# Patient Record
Sex: FEMALE | Race: BLACK OR AFRICAN AMERICAN | NOT HISPANIC OR LATINO | Employment: OTHER | URBAN - METROPOLITAN AREA
[De-identification: names, ages, dates, MRNs, and addresses within clinical notes are randomized per-mention and may not be internally consistent; named-entity substitution may affect disease eponyms.]

---

## 2018-09-17 ENCOUNTER — TRANSCRIBE ORDERS (OUTPATIENT)
Dept: ADMINISTRATIVE | Facility: HOSPITAL | Age: 68
End: 2018-09-17

## 2018-09-17 DIAGNOSIS — R90.82 WHITE MATTER DISEASE: Primary | ICD-10-CM

## 2018-10-02 ENCOUNTER — HOSPITAL ENCOUNTER (OUTPATIENT)
Dept: MRI IMAGING | Facility: HOSPITAL | Age: 68
Discharge: HOME | End: 2018-10-02
Admitting: PSYCHIATRY & NEUROLOGY

## 2018-10-02 ENCOUNTER — APPOINTMENT (OUTPATIENT)
Dept: MRI IMAGING | Facility: HOSPITAL | Age: 68
End: 2018-10-02

## 2018-10-02 ENCOUNTER — HOSPITAL ENCOUNTER (OUTPATIENT)
Dept: MRI IMAGING | Facility: HOSPITAL | Age: 68
Discharge: HOME | End: 2018-10-02

## 2018-10-02 DIAGNOSIS — R90.82 WHITE MATTER DISEASE: ICD-10-CM

## 2018-10-02 PROCEDURE — 70551 MRI BRAIN STEM W/O DYE: CPT

## 2018-10-02 PROCEDURE — 72141 MRI NECK SPINE W/O DYE: CPT

## 2019-01-10 ENCOUNTER — APPOINTMENT (OUTPATIENT)
Dept: CT IMAGING | Facility: HOSPITAL | Age: 69
End: 2019-01-10

## 2019-01-10 ENCOUNTER — APPOINTMENT (OUTPATIENT)
Dept: GENERAL RADIOLOGY | Facility: HOSPITAL | Age: 69
End: 2019-01-10

## 2019-01-10 ENCOUNTER — HOSPITAL ENCOUNTER (OUTPATIENT)
Facility: HOSPITAL | Age: 69
Setting detail: OBSERVATION
Discharge: HOME | End: 2019-01-13
Attending: EMERGENCY MEDICINE | Admitting: HOSPITALIST

## 2019-01-10 DIAGNOSIS — R26.2 DIFFICULTY WALKING: ICD-10-CM

## 2019-01-10 DIAGNOSIS — R42 VERTIGO: Primary | ICD-10-CM

## 2019-01-10 LAB
ALBUMIN SERPL-MCNC: 4.1 G/DL (ref 3.5–5.2)
ALBUMIN/GLOB SERPL: 1.1 G/DL
ALP SERPL-CCNC: 81 U/L (ref 39–117)
ALT SERPL W P-5'-P-CCNC: 18 U/L (ref 1–33)
ANION GAP SERPL CALCULATED.3IONS-SCNC: 13.7 MMOL/L
AST SERPL-CCNC: 21 U/L (ref 1–32)
BASOPHILS # BLD AUTO: 0.03 10*3/MM3 (ref 0–0.2)
BASOPHILS NFR BLD AUTO: 0.5 % (ref 0–1.5)
BILIRUB SERPL-MCNC: 0.3 MG/DL (ref 0.1–1.2)
BUN BLD-MCNC: 12 MG/DL (ref 8–23)
BUN/CREAT SERPL: 13.6 (ref 7–25)
CALCIUM SPEC-SCNC: 10.5 MG/DL (ref 8.6–10.5)
CHLORIDE SERPL-SCNC: 97 MMOL/L (ref 98–107)
CO2 SERPL-SCNC: 30.3 MMOL/L (ref 22–29)
CREAT BLD-MCNC: 0.88 MG/DL (ref 0.57–1)
D-LACTATE SERPL-SCNC: 0.9 MMOL/L (ref 0.5–2)
DEPRECATED RDW RBC AUTO: 44.6 FL (ref 37–54)
EOSINOPHIL # BLD AUTO: 0.46 10*3/MM3 (ref 0–0.7)
EOSINOPHIL NFR BLD AUTO: 7.5 % (ref 0.3–6.2)
ERYTHROCYTE [DISTWIDTH] IN BLOOD BY AUTOMATED COUNT: 13.2 % (ref 11.7–13)
FLUAV AG NPH QL: NEGATIVE
FLUBV AG NPH QL IA: NEGATIVE
GFR SERPL CREATININE-BSD FRML MDRD: 77 ML/MIN/1.73
GLOBULIN UR ELPH-MCNC: 3.8 GM/DL
GLUCOSE BLD-MCNC: 132 MG/DL (ref 65–99)
HCT VFR BLD AUTO: 36.3 % (ref 35.6–45.5)
HGB BLD-MCNC: 11.7 G/DL (ref 11.9–15.5)
IMM GRANULOCYTES # BLD AUTO: 0 10*3/MM3 (ref 0–0.03)
IMM GRANULOCYTES NFR BLD AUTO: 0 % (ref 0–0.5)
LYMPHOCYTES # BLD AUTO: 1.97 10*3/MM3 (ref 0.9–4.8)
LYMPHOCYTES NFR BLD AUTO: 32 % (ref 19.6–45.3)
MCH RBC QN AUTO: 29.8 PG (ref 26.9–32)
MCHC RBC AUTO-ENTMCNC: 32.2 G/DL (ref 32.4–36.3)
MCV RBC AUTO: 92.4 FL (ref 80.5–98.2)
MONOCYTES # BLD AUTO: 0.47 10*3/MM3 (ref 0.2–1.2)
MONOCYTES NFR BLD AUTO: 7.6 % (ref 5–12)
NEUTROPHILS # BLD AUTO: 3.23 10*3/MM3 (ref 1.9–8.1)
NEUTROPHILS NFR BLD AUTO: 52.4 % (ref 42.7–76)
PLATELET # BLD AUTO: 246 10*3/MM3 (ref 140–500)
PMV BLD AUTO: 10.2 FL (ref 6–12)
POTASSIUM BLD-SCNC: 4.1 MMOL/L (ref 3.5–5.2)
PROCALCITONIN SERPL-MCNC: 0.05 NG/ML (ref 0.1–0.25)
PROT SERPL-MCNC: 7.9 G/DL (ref 6–8.5)
RBC # BLD AUTO: 3.93 10*6/MM3 (ref 3.9–5.2)
SODIUM BLD-SCNC: 141 MMOL/L (ref 136–145)
TROPONIN T SERPL-MCNC: <0.01 NG/ML (ref 0–0.03)
WBC NRBC COR # BLD: 6.16 10*3/MM3 (ref 4.5–10.7)

## 2019-01-10 PROCEDURE — 36415 COLL VENOUS BLD VENIPUNCTURE: CPT | Performed by: EMERGENCY MEDICINE

## 2019-01-10 PROCEDURE — 83605 ASSAY OF LACTIC ACID: CPT | Performed by: EMERGENCY MEDICINE

## 2019-01-10 PROCEDURE — 96374 THER/PROPH/DIAG INJ IV PUSH: CPT

## 2019-01-10 PROCEDURE — 70450 CT HEAD/BRAIN W/O DYE: CPT

## 2019-01-10 PROCEDURE — 93005 ELECTROCARDIOGRAM TRACING: CPT | Performed by: EMERGENCY MEDICINE

## 2019-01-10 PROCEDURE — 84484 ASSAY OF TROPONIN QUANT: CPT | Performed by: EMERGENCY MEDICINE

## 2019-01-10 PROCEDURE — 80053 COMPREHEN METABOLIC PANEL: CPT | Performed by: EMERGENCY MEDICINE

## 2019-01-10 PROCEDURE — 25010000002 ONDANSETRON PER 1 MG: Performed by: EMERGENCY MEDICINE

## 2019-01-10 PROCEDURE — 93010 ELECTROCARDIOGRAM REPORT: CPT | Performed by: INTERNAL MEDICINE

## 2019-01-10 PROCEDURE — 87040 BLOOD CULTURE FOR BACTERIA: CPT | Performed by: EMERGENCY MEDICINE

## 2019-01-10 PROCEDURE — 71046 X-RAY EXAM CHEST 2 VIEWS: CPT

## 2019-01-10 PROCEDURE — 96361 HYDRATE IV INFUSION ADD-ON: CPT

## 2019-01-10 PROCEDURE — 87804 INFLUENZA ASSAY W/OPTIC: CPT | Performed by: EMERGENCY MEDICINE

## 2019-01-10 PROCEDURE — 99285 EMERGENCY DEPT VISIT HI MDM: CPT

## 2019-01-10 PROCEDURE — 85025 COMPLETE CBC W/AUTO DIFF WBC: CPT | Performed by: EMERGENCY MEDICINE

## 2019-01-10 PROCEDURE — 84145 PROCALCITONIN (PCT): CPT | Performed by: EMERGENCY MEDICINE

## 2019-01-10 RX ORDER — VALSARTAN 320 MG/1
320 TABLET ORAL DAILY
COMMUNITY

## 2019-01-10 RX ORDER — AMLODIPINE BESYLATE 10 MG/1
10 TABLET ORAL DAILY
COMMUNITY

## 2019-01-10 RX ORDER — FEBUXOSTAT 40 MG/1
40 TABLET, FILM COATED ORAL DAILY
COMMUNITY

## 2019-01-10 RX ORDER — GABAPENTIN 600 MG/1
600 TABLET ORAL 2 TIMES DAILY
COMMUNITY

## 2019-01-10 RX ORDER — PROMETHAZINE HYDROCHLORIDE AND CODEINE PHOSPHATE 6.25; 1 MG/5ML; MG/5ML
5 SYRUP ORAL EVERY 4 HOURS PRN
COMMUNITY
End: 2021-03-02

## 2019-01-10 RX ORDER — OMEPRAZOLE 40 MG/1
40 CAPSULE, DELAYED RELEASE ORAL 2 TIMES DAILY
COMMUNITY

## 2019-01-10 RX ORDER — BUDESONIDE 0.5 MG/2ML
0.5 INHALANT ORAL 2 TIMES DAILY
COMMUNITY

## 2019-01-10 RX ORDER — DIAZEPAM 5 MG/1
5 TABLET ORAL 2 TIMES DAILY PRN
COMMUNITY

## 2019-01-10 RX ORDER — PROPRANOLOL HYDROCHLORIDE 10 MG/1
10 TABLET ORAL DAILY PRN
COMMUNITY

## 2019-01-10 RX ORDER — FERROUS SULFATE 325(65) MG
325 TABLET ORAL 2 TIMES DAILY
COMMUNITY

## 2019-01-10 RX ORDER — ASPIRIN 81 MG/1
81 TABLET ORAL DAILY
COMMUNITY

## 2019-01-10 RX ORDER — SENNOSIDES 8.6 MG
650 CAPSULE ORAL EVERY 8 HOURS PRN
COMMUNITY

## 2019-01-10 RX ORDER — ONDANSETRON 2 MG/ML
4 INJECTION INTRAMUSCULAR; INTRAVENOUS ONCE
Status: COMPLETED | OUTPATIENT
Start: 2019-01-10 | End: 2019-01-10

## 2019-01-10 RX ORDER — NITROGLYCERIN 400 UG/1
1 SPRAY ORAL
COMMUNITY

## 2019-01-10 RX ORDER — MECLIZINE HYDROCHLORIDE 25 MG/1
25 TABLET ORAL ONCE
Status: COMPLETED | OUTPATIENT
Start: 2019-01-10 | End: 2019-01-10

## 2019-01-10 RX ORDER — ROSUVASTATIN CALCIUM 20 MG/1
20 TABLET, COATED ORAL DAILY
COMMUNITY

## 2019-01-10 RX ORDER — FUROSEMIDE 40 MG/1
40 TABLET ORAL 2 TIMES DAILY
COMMUNITY

## 2019-01-10 RX ORDER — SODIUM CHLORIDE 0.9 % (FLUSH) 0.9 %
10 SYRINGE (ML) INJECTION AS NEEDED
Status: DISCONTINUED | OUTPATIENT
Start: 2019-01-10 | End: 2019-01-13 | Stop reason: HOSPADM

## 2019-01-10 RX ORDER — IPRATROPIUM BROMIDE AND ALBUTEROL SULFATE 2.5; .5 MG/3ML; MG/3ML
3 SOLUTION RESPIRATORY (INHALATION) EVERY 4 HOURS PRN
COMMUNITY

## 2019-01-10 RX ORDER — HYDROCODONE BITARTRATE AND ACETAMINOPHEN 10; 325 MG/1; MG/1
1 TABLET ORAL EVERY 6 HOURS PRN
COMMUNITY

## 2019-01-10 RX ORDER — PROMETHAZINE HYDROCHLORIDE 25 MG/1
25 TABLET ORAL 2 TIMES DAILY PRN
COMMUNITY

## 2019-01-10 RX ADMIN — ONDANSETRON 4 MG: 2 INJECTION INTRAMUSCULAR; INTRAVENOUS at 23:14

## 2019-01-10 RX ADMIN — MECLIZINE HYDROCHLORIDE 25 MG: 25 TABLET ORAL at 23:45

## 2019-01-10 RX ADMIN — SODIUM CHLORIDE 1000 ML: 9 INJECTION, SOLUTION INTRAVENOUS at 23:11

## 2019-01-11 PROBLEM — R42 VERTIGO: Status: ACTIVE | Noted: 2019-01-11

## 2019-01-11 LAB
ALBUMIN SERPL-MCNC: 3.7 G/DL (ref 3.5–5.2)
ALBUMIN/GLOB SERPL: 1.2 G/DL
ALP SERPL-CCNC: 66 U/L (ref 39–117)
ALT SERPL W P-5'-P-CCNC: 16 U/L (ref 1–33)
ANION GAP SERPL CALCULATED.3IONS-SCNC: 12.2 MMOL/L
AST SERPL-CCNC: 18 U/L (ref 1–32)
BASOPHILS # BLD AUTO: 0.02 10*3/MM3 (ref 0–0.2)
BASOPHILS NFR BLD AUTO: 0.4 % (ref 0–1.5)
BILIRUB SERPL-MCNC: 0.4 MG/DL (ref 0.1–1.2)
BUN BLD-MCNC: 9 MG/DL (ref 8–23)
BUN/CREAT SERPL: 10.1 (ref 7–25)
CALCIUM SPEC-SCNC: 9.8 MG/DL (ref 8.6–10.5)
CHLORIDE SERPL-SCNC: 100 MMOL/L (ref 98–107)
CO2 SERPL-SCNC: 27.8 MMOL/L (ref 22–29)
CREAT BLD-MCNC: 0.89 MG/DL (ref 0.57–1)
DEPRECATED RDW RBC AUTO: 45.9 FL (ref 37–54)
EOSINOPHIL # BLD AUTO: 0.35 10*3/MM3 (ref 0–0.7)
EOSINOPHIL NFR BLD AUTO: 7.2 % (ref 0.3–6.2)
ERYTHROCYTE [DISTWIDTH] IN BLOOD BY AUTOMATED COUNT: 13.4 % (ref 11.7–13)
GFR SERPL CREATININE-BSD FRML MDRD: 76 ML/MIN/1.73
GLOBULIN UR ELPH-MCNC: 3.1 GM/DL
GLUCOSE BLD-MCNC: 143 MG/DL (ref 65–99)
HCT VFR BLD AUTO: 37.2 % (ref 35.6–45.5)
HGB BLD-MCNC: 11.6 G/DL (ref 11.9–15.5)
IMM GRANULOCYTES # BLD AUTO: 0 10*3/MM3 (ref 0–0.03)
IMM GRANULOCYTES NFR BLD AUTO: 0 % (ref 0–0.5)
LYMPHOCYTES # BLD AUTO: 1.74 10*3/MM3 (ref 0.9–4.8)
LYMPHOCYTES NFR BLD AUTO: 35.7 % (ref 19.6–45.3)
MCH RBC QN AUTO: 29.3 PG (ref 26.9–32)
MCHC RBC AUTO-ENTMCNC: 31.2 G/DL (ref 32.4–36.3)
MCV RBC AUTO: 93.9 FL (ref 80.5–98.2)
MONOCYTES # BLD AUTO: 0.25 10*3/MM3 (ref 0.2–1.2)
MONOCYTES NFR BLD AUTO: 5.1 % (ref 5–12)
NEUTROPHILS # BLD AUTO: 2.51 10*3/MM3 (ref 1.9–8.1)
NEUTROPHILS NFR BLD AUTO: 51.6 % (ref 42.7–76)
PLATELET # BLD AUTO: 250 10*3/MM3 (ref 140–500)
PMV BLD AUTO: 10.2 FL (ref 6–12)
POTASSIUM BLD-SCNC: 3.8 MMOL/L (ref 3.5–5.2)
PROT SERPL-MCNC: 6.8 G/DL (ref 6–8.5)
RBC # BLD AUTO: 3.96 10*6/MM3 (ref 3.9–5.2)
SODIUM BLD-SCNC: 140 MMOL/L (ref 136–145)
WBC NRBC COR # BLD: 4.87 10*3/MM3 (ref 4.5–10.7)

## 2019-01-11 PROCEDURE — 97161 PT EVAL LOW COMPLEX 20 MIN: CPT

## 2019-01-11 PROCEDURE — 96372 THER/PROPH/DIAG INJ SC/IM: CPT

## 2019-01-11 PROCEDURE — 97535 SELF CARE MNGMENT TRAINING: CPT

## 2019-01-11 PROCEDURE — 99203 OFFICE O/P NEW LOW 30 MIN: CPT | Performed by: PSYCHIATRY & NEUROLOGY

## 2019-01-11 PROCEDURE — G0378 HOSPITAL OBSERVATION PER HR: HCPCS

## 2019-01-11 PROCEDURE — 97165 OT EVAL LOW COMPLEX 30 MIN: CPT

## 2019-01-11 PROCEDURE — 96361 HYDRATE IV INFUSION ADD-ON: CPT

## 2019-01-11 PROCEDURE — 25010000002 ENOXAPARIN PER 10 MG: Performed by: INTERNAL MEDICINE

## 2019-01-11 PROCEDURE — 94799 UNLISTED PULMONARY SVC/PX: CPT

## 2019-01-11 PROCEDURE — 97110 THERAPEUTIC EXERCISES: CPT

## 2019-01-11 PROCEDURE — 85025 COMPLETE CBC W/AUTO DIFF WBC: CPT | Performed by: INTERNAL MEDICINE

## 2019-01-11 PROCEDURE — 63710000001 PREDNISONE PER 5 MG: Performed by: HOSPITALIST

## 2019-01-11 PROCEDURE — 94640 AIRWAY INHALATION TREATMENT: CPT

## 2019-01-11 PROCEDURE — 80053 COMPREHEN METABOLIC PANEL: CPT | Performed by: INTERNAL MEDICINE

## 2019-01-11 RX ORDER — MECLIZINE HYDROCHLORIDE 25 MG/1
25 TABLET ORAL ONCE
Status: COMPLETED | OUTPATIENT
Start: 2019-01-11 | End: 2019-01-11

## 2019-01-11 RX ORDER — SODIUM CHLORIDE 0.9 % (FLUSH) 0.9 %
3-10 SYRINGE (ML) INJECTION AS NEEDED
Status: DISCONTINUED | OUTPATIENT
Start: 2019-01-11 | End: 2019-01-13 | Stop reason: HOSPADM

## 2019-01-11 RX ORDER — SODIUM CHLORIDE 0.9 % (FLUSH) 0.9 %
3 SYRINGE (ML) INJECTION EVERY 12 HOURS SCHEDULED
Status: DISCONTINUED | OUTPATIENT
Start: 2019-01-11 | End: 2019-01-13 | Stop reason: HOSPADM

## 2019-01-11 RX ORDER — ASPIRIN 81 MG/1
81 TABLET ORAL DAILY
Status: DISCONTINUED | OUTPATIENT
Start: 2019-01-11 | End: 2019-01-13 | Stop reason: HOSPADM

## 2019-01-11 RX ORDER — FEBUXOSTAT 40 MG/1
40 TABLET, FILM COATED ORAL DAILY
Status: DISCONTINUED | OUTPATIENT
Start: 2019-01-11 | End: 2019-01-13 | Stop reason: HOSPADM

## 2019-01-11 RX ORDER — ONDANSETRON 4 MG/1
4 TABLET, FILM COATED ORAL EVERY 6 HOURS PRN
Status: DISCONTINUED | OUTPATIENT
Start: 2019-01-11 | End: 2019-01-13 | Stop reason: HOSPADM

## 2019-01-11 RX ORDER — SODIUM CHLORIDE 9 MG/ML
75 INJECTION, SOLUTION INTRAVENOUS CONTINUOUS
Status: DISCONTINUED | OUTPATIENT
Start: 2019-01-11 | End: 2019-01-12

## 2019-01-11 RX ORDER — ROSUVASTATIN CALCIUM 20 MG/1
20 TABLET, COATED ORAL DAILY
Status: DISCONTINUED | OUTPATIENT
Start: 2019-01-11 | End: 2019-01-13 | Stop reason: HOSPADM

## 2019-01-11 RX ORDER — DEXTROSE MONOHYDRATE 25 G/50ML
25 INJECTION, SOLUTION INTRAVENOUS
Status: DISCONTINUED | OUTPATIENT
Start: 2019-01-11 | End: 2019-01-13 | Stop reason: HOSPADM

## 2019-01-11 RX ORDER — VALSARTAN 320 MG/1
320 TABLET ORAL DAILY
Status: DISCONTINUED | OUTPATIENT
Start: 2019-01-11 | End: 2019-01-13 | Stop reason: HOSPADM

## 2019-01-11 RX ORDER — NICOTINE POLACRILEX 4 MG
15 LOZENGE BUCCAL
Status: DISCONTINUED | OUTPATIENT
Start: 2019-01-11 | End: 2019-01-13 | Stop reason: HOSPADM

## 2019-01-11 RX ORDER — ONDANSETRON 2 MG/ML
4 INJECTION INTRAMUSCULAR; INTRAVENOUS EVERY 6 HOURS PRN
Status: DISCONTINUED | OUTPATIENT
Start: 2019-01-11 | End: 2019-01-13 | Stop reason: HOSPADM

## 2019-01-11 RX ORDER — IPRATROPIUM BROMIDE AND ALBUTEROL SULFATE 2.5; .5 MG/3ML; MG/3ML
3 SOLUTION RESPIRATORY (INHALATION) EVERY 4 HOURS PRN
Status: DISCONTINUED | OUTPATIENT
Start: 2019-01-11 | End: 2019-01-13 | Stop reason: HOSPADM

## 2019-01-11 RX ORDER — DIAZEPAM 5 MG/1
5 TABLET ORAL EVERY 6 HOURS PRN
Status: DISCONTINUED | OUTPATIENT
Start: 2019-01-11 | End: 2019-01-13 | Stop reason: HOSPADM

## 2019-01-11 RX ORDER — ONDANSETRON 4 MG/1
4 TABLET, ORALLY DISINTEGRATING ORAL EVERY 6 HOURS PRN
Status: DISCONTINUED | OUTPATIENT
Start: 2019-01-11 | End: 2019-01-13 | Stop reason: HOSPADM

## 2019-01-11 RX ORDER — MECLIZINE HYDROCHLORIDE 25 MG/1
25 TABLET ORAL 3 TIMES DAILY PRN
Status: DISCONTINUED | OUTPATIENT
Start: 2019-01-11 | End: 2019-01-13 | Stop reason: HOSPADM

## 2019-01-11 RX ORDER — ACETAMINOPHEN 325 MG/1
650 TABLET ORAL EVERY 4 HOURS PRN
Status: DISCONTINUED | OUTPATIENT
Start: 2019-01-11 | End: 2019-01-13 | Stop reason: HOSPADM

## 2019-01-11 RX ORDER — BUDESONIDE 0.5 MG/2ML
0.5 INHALANT ORAL 2 TIMES DAILY
Status: DISCONTINUED | OUTPATIENT
Start: 2019-01-11 | End: 2019-01-13 | Stop reason: HOSPADM

## 2019-01-11 RX ORDER — HYDROCODONE BITARTRATE AND ACETAMINOPHEN 10; 325 MG/1; MG/1
1 TABLET ORAL EVERY 6 HOURS PRN
Status: DISCONTINUED | OUTPATIENT
Start: 2019-01-11 | End: 2019-01-13 | Stop reason: HOSPADM

## 2019-01-11 RX ORDER — GABAPENTIN 300 MG/1
600 CAPSULE ORAL EVERY 8 HOURS SCHEDULED
Status: DISCONTINUED | OUTPATIENT
Start: 2019-01-11 | End: 2019-01-12

## 2019-01-11 RX ORDER — AMLODIPINE BESYLATE 10 MG/1
10 TABLET ORAL DAILY
Status: DISCONTINUED | OUTPATIENT
Start: 2019-01-11 | End: 2019-01-13 | Stop reason: HOSPADM

## 2019-01-11 RX ORDER — PANTOPRAZOLE SODIUM 40 MG/1
40 TABLET, DELAYED RELEASE ORAL EVERY MORNING
Status: DISCONTINUED | OUTPATIENT
Start: 2019-01-11 | End: 2019-01-13 | Stop reason: HOSPADM

## 2019-01-11 RX ADMIN — ASPIRIN 81 MG: 81 TABLET, DELAYED RELEASE ORAL at 09:46

## 2019-01-11 RX ADMIN — BUDESONIDE 0.5 MG: 0.5 INHALANT RESPIRATORY (INHALATION) at 19:26

## 2019-01-11 RX ADMIN — SODIUM CHLORIDE 75 ML/HR: 9 INJECTION, SOLUTION INTRAVENOUS at 17:42

## 2019-01-11 RX ADMIN — PREDNISONE 60 MG: 50 TABLET ORAL at 11:56

## 2019-01-11 RX ADMIN — GABAPENTIN 600 MG: 300 CAPSULE ORAL at 21:48

## 2019-01-11 RX ADMIN — VALSARTAN 320 MG: 320 TABLET ORAL at 16:41

## 2019-01-11 RX ADMIN — AMLODIPINE BESYLATE 10 MG: 10 TABLET ORAL at 16:41

## 2019-01-11 RX ADMIN — BUDESONIDE 0.5 MG: 0.5 INHALANT RESPIRATORY (INHALATION) at 07:48

## 2019-01-11 RX ADMIN — SODIUM CHLORIDE, PRESERVATIVE FREE 3 ML: 5 INJECTION INTRAVENOUS at 21:48

## 2019-01-11 RX ADMIN — SODIUM CHLORIDE, PRESERVATIVE FREE 3 ML: 5 INJECTION INTRAVENOUS at 09:47

## 2019-01-11 RX ADMIN — ENOXAPARIN SODIUM 40 MG: 40 INJECTION SUBCUTANEOUS at 09:46

## 2019-01-11 RX ADMIN — ROSUVASTATIN CALCIUM 20 MG: 20 TABLET, FILM COATED ORAL at 09:46

## 2019-01-11 RX ADMIN — HYDROCODONE BITARTRATE AND ACETAMINOPHEN 1 TABLET: 10; 325 TABLET ORAL at 16:40

## 2019-01-11 RX ADMIN — MECLIZINE HYDROCHLORIDE 25 MG: 25 TABLET ORAL at 11:57

## 2019-01-11 RX ADMIN — PANTOPRAZOLE SODIUM 40 MG: 40 TABLET, DELAYED RELEASE ORAL at 09:46

## 2019-01-11 RX ADMIN — SODIUM CHLORIDE 100 ML/HR: 9 INJECTION, SOLUTION INTRAVENOUS at 05:15

## 2019-01-11 RX ADMIN — GABAPENTIN 600 MG: 300 CAPSULE ORAL at 16:40

## 2019-01-11 RX ADMIN — MECLIZINE HYDROCHLORIDE 25 MG: 25 TABLET ORAL at 00:42

## 2019-01-11 RX ADMIN — FEBUXOSTAT 40 MG: 40 TABLET ORAL at 16:41

## 2019-01-11 NOTE — H&P
Patient Name:  Zaira Menendez  YOB: 1950  MRN:  [id number]  Admit Date:  1/10/2019  Patient Care Team:  Alessandra Willingham MD as PCP - General (Family Medicine)      Chief Complaint   Patient presents with   • Nausea   • Dizziness   • Cough   • Fever   • Weakness - Generalized       History of Present Illness   Ms. Menendez is a 68 y.o.  with a history of HTN, CHF, COPD HLD, GERD and h/o DVT who presents to [facility] complaining of persistent dizziness. About two weeks ago she developed a viral infection, leading to the onset of her symptoms. During this time she felt light-headed and described the feeling as being in a wave-like state, worse when standing. Yesterday her symptoms worsened and she had associated nausea, vomiting, HA, subjective fever and chills. She felt as if the room was spinning even while lying down.       Past Medical History:   Diagnosis Date   • Acid reflux    • Anemia    • Asthma    • CHF (congestive heart failure) (CMS/[facility])    • COPD (chronic obstructive pulmonary disease) (CMS/[facility])    • Coronary artery disease    • DVT (deep vein thrombosis) in pregnancy (CMS/[facility])    • GERD (gastroesophageal reflux disease)    • Heart attack (CMS/[facility])    • Hyperlipidemia    • Hypertension    • Kidney failure     not on dialysis     Past Surgical History:   Procedure Laterality Date   • BACK SURGERY     • CARDIAC CATHETERIZATION     • CORONARY ANGIOPLASTY WITH STENT PLACEMENT     • HAND SURGERY     • HYSTERECTOMY     • KNEE SURGERY     • MASTECTOMY, PARTIAL     • TUBAL ABDOMINAL LIGATION       Family History   Problem Relation Age of Onset   • Hypertension Mother    • Diabetes Mother    • Heart disease Father 59   • Sudden death Father    • Hypertension Father    • Aneurysm Father    • Stroke Sister    • Hypertension Sister    • Hypertension Brother    • Cancer Brother    • Cancer Maternal Grandmother      Social History     Tobacco Use   • Smoking status: Former  Smoker   • Tobacco comment: CAFF USE   Substance Use Topics   • Alcohol use: No     Frequency: Never   • Drug use: Not on file     Medications Prior to Admission   Medication Sig Dispense Refill Last Dose   • acetaminophen (TYLENOL) 650 MG 8 hr tablet Take 650 mg by mouth Every 8 (Eight) Hours As Needed for Mild Pain .      • albuterol (PROAIR RESPICLICK) 108 (90 Base) MCG/ACT inhaler Inhale 2 puffs Every 4 (Four) Hours As Needed for Wheezing.      • amLODIPine (NORVASC) 10 MG tablet Take 10 mg by mouth Daily.      • aspirin 81 MG EC tablet Take 81 mg by mouth Daily.      • budesonide (PULMICORT) 0.5 MG/2ML nebulizer solution Take 0.5 mg by nebulization 2 (Two) Times a Day.      • Cholecalciferol (VITAMIN D3) 5000 units capsule capsule Take 5,000 Units by mouth Daily.      • Dextromethorphan-Guaifenesin (MUCINEX DM PO) Take 1,200 mg by mouth.      • diazePAM (VALIUM) 5 MG tablet Take 5 mg by mouth 2 (Two) Times a Day As Needed for Anxiety.      • diclofenac (VOLTAREN) 1 % gel gel Apply 4 g topically to the appropriate area as directed 4 (Four) Times a Day As Needed.      • febuxostat (ULORIC) 40 MG tablet Take 40 mg by mouth Daily.      • ferrous sulfate 325 (65 FE) MG tablet Take 325 mg by mouth 2 (Two) Times a Day.      • Fexofenadine HCl (MUCINEX ALLERGY PO) Take 120 mg by mouth 2 (Two) Times a Day As Needed.      • furosemide (LASIX) 40 MG tablet Take 40 mg by mouth 2 (Two) Times a Day As Needed.      • gabapentin (NEURONTIN) 600 MG tablet Take 600 mg by mouth 3 (Three) Times a Day As Needed.      • HYDROcodone-acetaminophen (NORCO)  MG per tablet Take 1 tablet by mouth Every 6 (Six) Hours As Needed for Moderate Pain .      • ipratropium-albuterol (DUO-NEB) 0.5-2.5 mg/3 ml nebulizer Take 3 mL by nebulization Every 4 (Four) Hours As Needed for Wheezing.      • magnesium oxide (MAG-OX) 400 tablet tablet Take 400 mg by mouth 2 (Two) Times a Day As Needed.      • nitroglycerin (NITROLINGUAL) 0.4 MG/SPRAY spray  Place 1 spray under the tongue Every 5 (Five) Minutes As Needed for Chest Pain.      • omeprazole (priLOSEC) 40 MG capsule Take 40 mg by mouth 2 (Two) Times a Day.      • polyethyl glycol-propyl glycol (SYSTANE) 0.4-0.3 % solution ophthalmic solution At Night As Needed.      • promethazine (PHENERGAN) 25 MG tablet Take 25 mg by mouth 2 (Two) Times a Day As Needed for Nausea or Vomiting.      • promethazine-codeine (PHENERGAN with CODEINE) 6.25-10 MG/5ML syrup Take 5 mL by mouth Every 4 (Four) Hours As Needed for Cough.      • propranolol (INDERAL) 10 MG tablet Take 5 mg by mouth Daily As Needed.      • rosuvastatin (CRESTOR) 20 MG tablet Take 20 mg by mouth Daily.      • valsartan (DIOVAN) 80 MG tablet Take 320 mg by mouth Daily.        Allergies:    Allergies   Allergen Reactions   • Morphine Hives, Itching and Rash   • Ibuprofen Other (See Comments)   • Naproxen Other (See Comments)   • Baclofen Other (See Comments)     PER PT   • Latex Unknown (See Comments)       Review of Systems   Constitutional: Positive for chills and fever. Negative for activity change.   HENT: Positive for congestion and rhinorrhea.    Eyes: Negative for discharge and itching.   Respiratory: Positive for shortness of breath (chronic ). Negative for cough and chest tightness.    Cardiovascular: Negative for leg swelling.   Gastrointestinal: Positive for constipation, nausea and vomiting. Negative for abdominal distention.   Neurological: Positive for dizziness, light-headedness and headaches. Negative for syncope.        Objective    Vital Signs  Temp:  [97.7 °F (36.5 °C)-98 °F (36.7 °C)] 98 °F (36.7 °C)  Heart Rate:  [66-94] 78  Resp:  [14-20] 20  BP: (104-142)/(57-78) 142/67  SpO2:  [93 %-98 %] 97 %  on   ;   Device (Oxygen Therapy): room air  Body mass index is 46.33 kg/m².    Physical Exam   Constitutional: She is oriented to person, place, and time. She appears well-developed and well-nourished. No distress.   HENT:   Head:  Normocephalic and atraumatic.   Eyes: Conjunctivae and EOM are normal. Pupils are equal, round, and reactive to light.   Neck: Normal range of motion. Neck supple.   Cardiovascular: Normal rate and regular rhythm.   Pulmonary/Chest: Effort normal and breath sounds normal. No respiratory distress.   Musculoskeletal: Normal range of motion. She exhibits no edema.   Neurological: She is alert and oriented to person, place, and time.   Skin: Skin is warm and dry.   Psychiatric: She has a normal mood and affect. Her behavior is normal.   Nursing note and vitals reviewed.      Results Review:  I reviewed the patient's new clinical results.  I reviewed the patient's new imaging results and agree with the interpretation.  I reviewed the patient's other test results and agree with the interpretation  I personally viewed and interpreted the patient's EKG/Telemetry data    Lab Results (last 24 hours)     Procedure Component Value Units Date/Time    CBC & Differential [[id number]] Collected:  01/10/19 2229    Specimen:  Blood Updated:  01/10/19 2249    Narrative:       The following orders were created for panel order CBC & Differential.  Procedure                               Abnormality         Status                     ---------                               -----------         ------                     CBC Auto Differential[[id number]]        Abnormal            Final result                 Please view results for these tests on the individual orders.    Comprehensive Metabolic Panel [[id number]]  (Abnormal) Collected:  01/10/19 2229    Specimen:  Blood Updated:  01/10/19 2312     Glucose 132 mg/dL      BUN 12 mg/dL      Creatinine 0.88 mg/dL      Sodium 141 mmol/L      Potassium 4.1 mmol/L      Chloride 97 mmol/L      CO2 30.3 mmol/L      Calcium 10.5 mg/dL      Total Protein 7.9 g/dL      Albumin 4.10 g/dL      ALT (SGPT) 18 U/L      AST (SGOT) 21 U/L      Alkaline Phosphatase 81 U/L      Total Bilirubin 0.3 mg/dL       "eGFR   Amer 77 mL/min/1.73      Globulin 3.8 gm/dL      A/G Ratio 1.1 g/dL      BUN/Creatinine Ratio 13.6     Anion Gap 13.7 mmol/L     Procalcitonin [[id number]]  (Abnormal) Collected:  01/10/19 2229    Specimen:  Blood Updated:  01/10/19 2321     Procalcitonin 0.05 ng/mL     Narrative:       As a Marker for Sepsis (Non-Neonates):   1. <0.5 ng/mL represents a low risk of severe sepsis and/or septic shock.  1. >2 ng/mL represents a high risk of severe sepsis and/or septic shock.    As a Marker for Lower Respiratory Tract Infections that require antibiotic therapy:  PCT on Admission     Antibiotic Therapy             6-12 Hrs later  > 0.5                Strongly Recommended            >0.25 - <0.5         Recommended  0.1 - 0.25           Discouraged                   Remeasure/reassess PCT  <0.1                 Strongly Discouraged          Remeasure/reassess PCT      As 28 day mortality risk marker: \"Change in Procalcitonin Result\" (> 80 % or <=80 %) if Day 0 (or Day 1) and Day 4 values are available. Refer to http://www.[place]-pct-calculator.com/   Change in PCT <=80 %   A decrease of PCT levels below or equal to 80 % defines a positive change in PCT test result representing a higher risk for 28-day all-cause mortality of patients diagnosed with severe sepsis or septic shock.  Change in PCT > 80 %   A decrease of PCT levels of more than 80 % defines a negative change in PCT result representing a lower risk for 28-day all-cause mortality of patients diagnosed with severe sepsis or septic shock.                Troponin [[id number]]  (Normal) Collected:  01/10/19 2229    Specimen:  Blood Updated:  01/10/19 2318     Troponin T <0.010 ng/mL     Narrative:       Troponin T Reference Ranges:  Less than 0.03 ng/mL:    Negative for AMI  0.03 to 0.09 ng/mL:      Indeterminant for AMI  Greater than 0.09 ng/mL: Positive for AMI    Blood Culture - Blood, Arm, Right [[id number]] Collected:  01/10/19 2229    Specimen:  Blood " from Arm, Right Updated:  01/10/19 2243    Lactic Acid, Plasma [[id number]]  (Normal) Collected:  01/10/19 2229    Specimen:  Blood Updated:  01/10/19 2300     Lactate 0.9 mmol/L     CBC Auto Differential [[id number]]  (Abnormal) Collected:  01/10/19 2229    Specimen:  Blood Updated:  01/10/19 2249     WBC 6.16 10*3/mm3      RBC 3.93 10*6/mm3      Hemoglobin 11.7 g/dL      Hematocrit 36.3 %      MCV 92.4 fL      MCH 29.8 pg      MCHC 32.2 g/dL      RDW 13.2 %      RDW-SD 44.6 fl      MPV 10.2 fL      Platelets 246 10*3/mm3      Neutrophil % 52.4 %      Lymphocyte % 32.0 %      Monocyte % 7.6 %      Eosinophil % 7.5 %      Basophil % 0.5 %      Immature Grans % 0.0 %      Neutrophils, Absolute 3.23 10*3/mm3      Lymphocytes, Absolute 1.97 10*3/mm3      Monocytes, Absolute 0.47 10*3/mm3      Eosinophils, Absolute 0.46 10*3/mm3      Basophils, Absolute 0.03 10*3/mm3      Immature Grans, Absolute 0.00 10*3/mm3     Influenza Antigen, Rapid - Swab, Nasopharynx [[id number]]  (Normal) Collected:  01/10/19 2235    Specimen:  Swab from Nasopharynx Updated:  01/10/19 2316     Influenza A Ag, EIA Negative     Influenza B Ag, EIA Negative    Blood Culture - Blood, Arm, Right [[id number]] Collected:  01/10/19 2310    Specimen:  Blood from Arm, Right Updated:  01/10/19 2317    CBC Auto Differential [[id number]]  (Abnormal) Collected:  01/11/19 0432    Specimen:  Blood Updated:  01/11/19 0545     WBC 4.87 10*3/mm3      RBC 3.96 10*6/mm3      Hemoglobin 11.6 g/dL      Hematocrit 37.2 %      MCV 93.9 fL      MCH 29.3 pg      MCHC 31.2 g/dL      RDW 13.4 %      RDW-SD 45.9 fl      MPV 10.2 fL      Platelets 250 10*3/mm3      Neutrophil % 51.6 %      Lymphocyte % 35.7 %      Monocyte % 5.1 %      Eosinophil % 7.2 %      Basophil % 0.4 %      Immature Grans % 0.0 %      Neutrophils, Absolute 2.51 10*3/mm3      Lymphocytes, Absolute 1.74 10*3/mm3      Monocytes, Absolute 0.25 10*3/mm3      Eosinophils, Absolute 0.35 10*3/mm3      Basophils,  Absolute 0.02 10*3/mm3      Immature Grans, Absolute 0.00 10*3/mm3     Comprehensive Metabolic Panel [[id number]]  (Abnormal) Collected:  01/11/19 0432    Specimen:  Blood Updated:  01/11/19 0615     Glucose 143 mg/dL      BUN 9 mg/dL      Creatinine 0.89 mg/dL      Sodium 140 mmol/L      Potassium 3.8 mmol/L      Chloride 100 mmol/L      CO2 27.8 mmol/L      Calcium 9.8 mg/dL      Total Protein 6.8 g/dL      Albumin 3.70 g/dL      ALT (SGPT) 16 U/L      AST (SGOT) 18 U/L      Alkaline Phosphatase 66 U/L      Total Bilirubin 0.4 mg/dL      eGFR  African Amer 76 mL/min/1.73      Globulin 3.1 gm/dL      A/G Ratio 1.2 g/dL      BUN/Creatinine Ratio 10.1     Anion Gap 12.2 mmol/L           Imaging Results (last 24 hours)     Procedure Component Value Units Date/Time    CT Head Without Contrast [[id number]] Collected:  01/11/19 0110     Updated:  01/11/19 0116    Narrative:       CT OF THE HEAD WITHOUT CONTRAST     HISTORY: Headache and vertigo     COMPARISON: October 2, 2018     TECHNIQUE: Axial CT imaging was obtained from the vertex of skull the  skull base. No IV contrast was administered.     FINDINGS:  No acute intracranial hemorrhage is seen. There is diffuse cerebral  atrophy, with compensatory ventricular dilatation, in keeping with the  age of 68. There is no midline shift or mass effect. The visualized  paranasal sinuses and mastoid air cells appear clear.       Impression:       No acute intracranial process identified.     Radiation dose reduction techniques were utilized, including automated  exposure control and exposure modulation based on body size.     This report was finalized on 1/11/2019 1:13 AM by Dr. Selam Kaur M.D.       XR Chest 2 View [[id number]] Collected:  01/10/19 2255     Updated:  01/10/19 2259    Narrative:       PA AND LATERAL CHEST RADIOGRAPH     HISTORY: Shortness of air     COMPARISON: None available.     FINDINGS:  Cardiomegaly is identified. There is no evidence of vascular  congestion.  No pneumothorax or pleural effusion is seen. I do think the patient has  some mild bibasilar atelectasis.       Impression:       Mild bibasilar atelectasis. No acute infiltrates.     This report was finalized on 1/10/2019 10:56 PM by Dr. Selam Kaur M.D.             ECG 12 Lead           Assessment/Plan   Active Hospital Problems    Diagnosis Date Noted   • Vertigo [R42] 01/11/2019      Resolved Hospital Problems   No resolved problems to display.     · Vestibular neuritis secondary to recent viral infection  · Neurology following, appreciate recommendations   · PO prednisone  · monitor blood sugars closely as she states h/o oral steroids and hyperglycemia  · Vestibular rehab outpatient   · HTN  · COPD  · CKD  · HLD  · GERD      We will monitor patient's symptoms overnight on the prednisone.  Hopefully the patient be discharged tomorrow on oral steroids.    I discussed the patients findings and my recommendations with patient, nursing staff and consulting provider.    D/w neurology, reviewed old MRI    Leobardo Gill MD  [facility]  01/11/19  10:25 AM

## 2019-01-11 NOTE — DISCHARGE PLACEMENT REQUEST
"Drake Blank (68 y.o. Female)     Date of Birth Social Security Number Address Home Phone MRN    1950  [id number] [address] [phone] [id number]    Denominational Marital Status          Synagogue        Admission Date Admission Type Admitting Provider Attending Provider Department, Room/Bed    1/10/19 Emergency Leobardo Gill MD Edling, Stephen A, MD [address], [address]    Discharge Date Discharge Disposition Discharge Destination                       Attending Provider:  Leobardo Gill MD    Allergies:  Morphine, Ibuprofen, Naproxen, Baclofen, Latex    Isolation:  None   Infection:  None   Code Status:  CPR    Ht:  152.4 cm (60\")   Wt:  108 kg (237 lb 3.4 oz)    Admission Cmt:  None   Principal Problem:  None                Active Insurance as of 1/10/2019     Primary Coverage     Payor Plan Insurance Group Employer/Plan Group    MEDICARE MEDICARE A & B      Payor Plan Address Payor Plan Phone Number Payor Plan Fax Number Effective Dates    [address] [phone]  10/1/2014 - None Entered    [facility] [id number]       Subscriber Name Subscriber Birth Date Member ID       DRAKE BLANK 1950 [id number]           Secondary Coverage     Payor Plan Insurance Group Employer/Plan Group    AARP MED SUPP AARP HEALTH CARE OPTIONS      Payor Plan Address Payor Plan Phone Number Payor Plan Fax Number Effective Dates    [facility] [phone]  1/1/2018 - None Entered    [address]       [facility] [address]       Subscriber Name Subscriber Birth Date Member ID       DRAKE BLANK 1950 [id number]                 Emergency Contacts      (Rel.) Home Phone Work Phone Mobile Phone    Nelida Zacarias (Daughter) [phone] -- [phone]              "

## 2019-01-11 NOTE — PLAN OF CARE
Problem: Patient Care Overview  Goal: Plan of Care Review  Outcome: Ongoing (interventions implemented as appropriate)   01/11/19 0701   Coping/Psychosocial   Plan of Care Reviewed With patient   Plan of Care Review   Progress no change   OTHER   Outcome Summary VSS. Pt alert and oriented. pleasant. admitted in the night for vertigo. c/o dizziness with movement with nausea. no new complaints overnight. Will CTM     Goal: Discharge Needs Assessment  Outcome: Ongoing (interventions implemented as appropriate)      Problem: Fall Risk (Adult)  Goal: Absence of Fall  Outcome: Ongoing (interventions implemented as appropriate)

## 2019-01-11 NOTE — ED TRIAGE NOTES
"Pt c/o \"wavy in my head, swimming, can't stand, nausea, vomiting, headache, and deep chest pain and my whole body hurts\" since yesterday.  "

## 2019-01-11 NOTE — ED PROVIDER NOTES
Pt presents to the ED c/o dizziness that began yesterday. Pt states that the dizziness is present with any movement. Sh reports associated nausea and vomiting. She reports a hx of vertigo in her teenage years. On exam, Pt is resting comfortably, in no distress, and without focal neurologic deficit. Cardiovascular exam is within normal limits and without murmur. Lungs are CTAB. Plan for labs and CT head.     Attestation:  The BETITO and I have discussed this patient's history, physical exam, and treatment plan.  I have reviewed the documentation and personally had a face to face interaction with the patient. I affirm the documentation and agree with the treatment and plan.  The attached note describes my personal findings.      Documentation assistance provided by korin Diez for Dr Abarca. Information recorded by the scribe was done at my direction and has been verified and validated by me.     Alicia Diez  01/10/19 8697       Guillermo Abarca MD  01/11/19 0164

## 2019-01-11 NOTE — PLAN OF CARE
Problem: Patient Care Overview  Goal: Plan of Care Review   01/11/19 8632   Coping/Psychosocial   Plan of Care Reviewed With patient   OTHER   Outcome Summary Pt is 69 yo female adm with dizziness with movement, abd & chest pain, N/V. Presents today with slight c/o of dizziness both at rest in bed & during movements. Pt able to perform bed mob, txfs, and ambulation safely without increase in symptoms. She will benefit from outpatient or home health vestibular rehab per Dr. Cantu's orders to address her symptoms.

## 2019-01-11 NOTE — ED PROVIDER NOTES
EMERGENCY DEPARTMENT ENCOUNTER    CHIEF COMPLAINT  Chief Complaint: Dizziness  History given by: Patient  History limited by: None  Room Number: S517/1  PMD: Alessandra Willingham MD      HPI:  Pt is a 68 y.o. female who presents complaining of dizziness, exacerbated by movement, that began yesterday. Pt also c/o abd pain, intermittent chest pain, subjective fever, nausea, vomiting x1, and HA, but denies ear pain, chills, and diarrhea.    Duration: Began yesterday  Onset: Gradual  Timing: Constant  Intensity/Severity: Moderate  Progression: Unchanged  Associated Symptoms: Abd pain, intermittent chest pain, subjective fever, nausea, vomiting x1, and HA  Aggravating Factors: Exacerbated by movement  Previous Episodes: None  Treatment before arrival: None    PAST MEDICAL HISTORY  Active Ambulatory Problems     Diagnosis Date Noted   • No Active Ambulatory Problems     Resolved Ambulatory Problems     Diagnosis Date Noted   • No Resolved Ambulatory Problems     Past Medical History:   Diagnosis Date   • Acid reflux    • Anemia    • Asthma    • CHF (congestive heart failure) (CMS/Formerly [facility])    • COPD (chronic obstructive pulmonary disease) (CMS/Formerly [facility])    • Coronary artery disease    • DVT (deep vein thrombosis) in pregnancy (CMS/Formerly [facility])    • GERD (gastroesophageal reflux disease)    • Heart attack (CMS/Formerly [facility])    • Hyperlipidemia    • Hypertension    • Kidney failure        PAST SURGICAL HISTORY  Past Surgical History:   Procedure Laterality Date   • BACK SURGERY     • CARDIAC CATHETERIZATION     • CORONARY ANGIOPLASTY WITH STENT PLACEMENT     • HAND SURGERY     • HYSTERECTOMY     • KNEE SURGERY     • MASTECTOMY, PARTIAL     • TUBAL ABDOMINAL LIGATION         FAMILY HISTORY  Family History   Problem Relation Age of Onset   • Hypertension Mother    • Diabetes Mother    • Heart disease Father 59   • Sudden death Father    • Hypertension Father    • Aneurysm Father    • Stroke Sister    • Hypertension Sister    • Hypertension Brother     • Cancer Brother    • Cancer Maternal Grandmother        SOCIAL HISTORY  Social History     Socioeconomic History   • Marital status:      Spouse name: Not on file   • Number of children: Not on file   • Years of education: Not on file   • Highest education level: Not on file   Social Needs   • Financial resource strain: Not on file   • Food insecurity - worry: Not on file   • Food insecurity - inability: Not on file   • Transportation needs - medical: Not on file   • Transportation needs - non-medical: Not on file   Occupational History   • Not on file   Tobacco Use   • Smoking status: Former Smoker   • Tobacco comment: CAFF USE   Substance and Sexual Activity   • Alcohol use: No     Frequency: Never   • Drug use: Not on file   • Sexual activity: Not on file   Other Topics Concern   • Not on file   Social History Narrative   • Not on file       ALLERGIES  Morphine; Ibuprofen; Naproxen; Baclofen; and Latex    REVIEW OF SYSTEMS  Review of Systems   Constitutional: Positive for fever (subjective).   HENT: Negative for sore throat.    Eyes: Negative.    Respiratory: Negative for cough and shortness of breath.    Cardiovascular: Negative for chest pain (intermittent).   Gastrointestinal: Positive for nausea and vomiting (x1). Negative for abdominal pain and diarrhea.   Genitourinary: Negative for dysuria.   Musculoskeletal: Negative for neck pain.   Skin: Negative for rash.   Allergic/Immunologic: Negative.    Neurological: Positive for dizziness (exacerbated by movement) and headaches. Negative for weakness and numbness.   Hematological: Negative.    Psychiatric/Behavioral: Negative.    All other systems reviewed and are negative.      PHYSICAL EXAM  ED Triage Vitals   Temp Heart Rate Resp BP SpO2   01/10/19 2057 01/10/19 2057 01/10/19 2057 01/10/19 2100 01/10/19 2057   97.7 °F (36.5 °C) 94 18 136/78 98 %      Temp src Heart Rate Source Patient Position BP Location FiO2 (%)   -- -- -- -- --               Physical Exam   Constitutional: She is oriented to person, place, and time. No distress.   HENT:   Head: Normocephalic and atraumatic.   Eyes: EOM are normal. Pupils are equal, round, and reactive to light.   Neck: Normal range of motion. Neck supple. No neck rigidity.   Cardiovascular: Normal rate, regular rhythm and normal heart sounds.   Pulmonary/Chest: Effort normal and breath sounds normal. No respiratory distress.   Abdominal: Soft. There is no tenderness. There is no rebound and no guarding.   Musculoskeletal: Normal range of motion. She exhibits edema (chronic LLE).   Neurological: She is alert and oriented to person, place, and time. She has normal sensation and normal strength. No cranial nerve deficit.   Vertigo exacerbated with head movement and attempted ambulation.  Patient exhibits no focal neuro deficit and no ataxia with ambulation.   Skin: Skin is warm and dry. No rash noted.   Psychiatric: Mood and affect normal.   Nursing note and vitals reviewed.      LAB RESULTS  Lab Results (last 24 hours)     Procedure Component Value Units Date/Time    CBC & Differential [[id number]] Collected:  01/10/19 2229    Specimen:  Blood Updated:  01/10/19 2249    Narrative:       The following orders were created for panel order CBC & Differential.  Procedure                               Abnormality         Status                     ---------                               -----------         ------                     CBC Auto Differential[[id number]]        Abnormal            Final result                 Please view results for these tests on the individual orders.    Comprehensive Metabolic Panel [[id number]]  (Abnormal) Collected:  01/10/19 2229    Specimen:  Blood Updated:  01/10/19 2312     Glucose 132 mg/dL      BUN 12 mg/dL      Creatinine 0.88 mg/dL      Sodium 141 mmol/L      Potassium 4.1 mmol/L      Chloride 97 mmol/L      CO2 30.3 mmol/L      Calcium 10.5 mg/dL      Total Protein 7.9 g/dL       "Albumin 4.10 g/dL      ALT (SGPT) 18 U/L      AST (SGOT) 21 U/L      Alkaline Phosphatase 81 U/L      Total Bilirubin 0.3 mg/dL      eGFR   Amer 77 mL/min/1.73      Globulin 3.8 gm/dL      A/G Ratio 1.1 g/dL      BUN/Creatinine Ratio 13.6     Anion Gap 13.7 mmol/L     Procalcitonin [[id number]]  (Abnormal) Collected:  01/10/19 2229    Specimen:  Blood Updated:  01/10/19 2321     Procalcitonin 0.05 ng/mL     Narrative:       As a Marker for Sepsis (Non-Neonates):   1. <0.5 ng/mL represents a low risk of severe sepsis and/or septic shock.  1. >2 ng/mL represents a high risk of severe sepsis and/or septic shock.    As a Marker for Lower Respiratory Tract Infections that require antibiotic therapy:  PCT on Admission     Antibiotic Therapy             6-12 Hrs later  > 0.5                Strongly Recommended            >0.25 - <0.5         Recommended  0.1 - 0.25           Discouraged                   Remeasure/reassess PCT  <0.1                 Strongly Discouraged          Remeasure/reassess PCT      As 28 day mortality risk marker: \"Change in Procalcitonin Result\" (> 80 % or <=80 %) if Day 0 (or Day 1) and Day 4 values are available. Refer to http://www.[employer]-pct-calculator.com/   Change in PCT <=80 %   A decrease of PCT levels below or equal to 80 % defines a positive change in PCT test result representing a higher risk for 28-day all-cause mortality of patients diagnosed with severe sepsis or septic shock.  Change in PCT > 80 %   A decrease of PCT levels of more than 80 % defines a negative change in PCT result representing a lower risk for 28-day all-cause mortality of patients diagnosed with severe sepsis or septic shock.                Troponin [[id number]]  (Normal) Collected:  01/10/19 2229    Specimen:  Blood Updated:  01/10/19 2318     Troponin T <0.010 ng/mL     Narrative:       Troponin T Reference Ranges:  Less than 0.03 ng/mL:    Negative for AMI  0.03 to 0.09 ng/mL:      Indeterminant for " AMI  Greater than 0.09 ng/mL: Positive for AMI    Blood Culture - Blood, Arm, Right [[id number]] Collected:  01/10/19 2229    Specimen:  Blood from Arm, Right Updated:  01/10/19 2243    Lactic Acid, Plasma [[id number]]  (Normal) Collected:  01/10/19 2229    Specimen:  Blood Updated:  01/10/19 2300     Lactate 0.9 mmol/L     CBC Auto Differential [[id number]]  (Abnormal) Collected:  01/10/19 2229    Specimen:  Blood Updated:  01/10/19 2249     WBC 6.16 10*3/mm3      RBC 3.93 10*6/mm3      Hemoglobin 11.7 g/dL      Hematocrit 36.3 %      MCV 92.4 fL      MCH 29.8 pg      MCHC 32.2 g/dL      RDW 13.2 %      RDW-SD 44.6 fl      MPV 10.2 fL      Platelets 246 10*3/mm3      Neutrophil % 52.4 %      Lymphocyte % 32.0 %      Monocyte % 7.6 %      Eosinophil % 7.5 %      Basophil % 0.5 %      Immature Grans % 0.0 %      Neutrophils, Absolute 3.23 10*3/mm3      Lymphocytes, Absolute 1.97 10*3/mm3      Monocytes, Absolute 0.47 10*3/mm3      Eosinophils, Absolute 0.46 10*3/mm3      Basophils, Absolute 0.03 10*3/mm3      Immature Grans, Absolute 0.00 10*3/mm3     Influenza Antigen, Rapid - Swab, Nasopharynx [[id number]]  (Normal) Collected:  01/10/19 2235    Specimen:  Swab from Nasopharynx Updated:  01/10/19 2316     Influenza A Ag, EIA Negative     Influenza B Ag, EIA Negative    Blood Culture - Blood, Arm, Right [[id number]] Collected:  01/10/19 2310    Specimen:  Blood from Arm, Right Updated:  01/10/19 2317    CBC Auto Differential [[id number]]  (Abnormal) Collected:  01/11/19 0432    Specimen:  Blood Updated:  01/11/19 0545     WBC 4.87 10*3/mm3      RBC 3.96 10*6/mm3      Hemoglobin 11.6 g/dL      Hematocrit 37.2 %      MCV 93.9 fL      MCH 29.3 pg      MCHC 31.2 g/dL      RDW 13.4 %      RDW-SD 45.9 fl      MPV 10.2 fL      Platelets 250 10*3/mm3      Neutrophil % 51.6 %      Lymphocyte % 35.7 %      Monocyte % 5.1 %      Eosinophil % 7.2 %      Basophil % 0.4 %      Immature Grans % 0.0 %      Neutrophils, Absolute 2.51  10*3/mm3      Lymphocytes, Absolute 1.74 10*3/mm3      Monocytes, Absolute 0.25 10*3/mm3      Eosinophils, Absolute 0.35 10*3/mm3      Basophils, Absolute 0.02 10*3/mm3      Immature Grans, Absolute 0.00 10*3/mm3     Comprehensive Metabolic Panel [[id number]]  (Abnormal) Collected:  01/11/19 0432    Specimen:  Blood Updated:  01/11/19 0615     Glucose 143 mg/dL      BUN 9 mg/dL      Creatinine 0.89 mg/dL      Sodium 140 mmol/L      Potassium 3.8 mmol/L      Chloride 100 mmol/L      CO2 27.8 mmol/L      Calcium 9.8 mg/dL      Total Protein 6.8 g/dL      Albumin 3.70 g/dL      ALT (SGPT) 16 U/L      AST (SGOT) 18 U/L      Alkaline Phosphatase 66 U/L      Total Bilirubin 0.4 mg/dL      eGFR  African Amer 76 mL/min/1.73      Globulin 3.1 gm/dL      A/G Ratio 1.2 g/dL      BUN/Creatinine Ratio 10.1     Anion Gap 12.2 mmol/L           I ordered the above labs and reviewed the results    RADIOLOGY  CT Head Without Contrast   Final Result   No acute intracranial process identified.       Radiation dose reduction techniques were utilized, including automated   exposure control and exposure modulation based on body size.       This report was finalized on 1/11/2019 1:13 AM by Dr. Selam Kaur M.D.          XR Chest 2 View   Final Result   Mild bibasilar atelectasis. No acute infiltrates.       This report was finalized on 1/10/2019 10:56 PM by Dr. Selam Kaur M.D.          MRI Brain With & Without Contrast    (Results Pending)        I ordered the above noted radiological studies. Interpreted by radiologist. Reviewed by me in PACS.     PROCEDURES    EKG          EKG time: 2237  Rhythm/Rate: NSR rate 72  P waves and DE: L atrial enlargement, Nml SANTIAGO  QRS, axis: Nml QRS, Nml axis  ST and T waves: Nonspecific T wave changes in inferior leads     Interpreted Contemporaneously by me, independently viewed  No old for comparison.    PROGRESS AND CONSULTS     2238 Ordered CT head for further evaluation. Ordered IVF for  hydration and antivert for dizziness.    2335 Discussed pt with Dr. Abarca, who has performed a bedside evaluation and agrees with plan of care.    0023 Rechecked with pt, who states her dizziness has improved, but not resolved.    0027 Ordered additional antivert for continued dizziness.    0117 Per RN, while ambulating patient she complained of dizziness.    0119 Rechecked with pt, who states she does not feel well enough to go home. Plan to admit. Pt understands and agrees with the plan, all questions answered.    0217 Placed call to [facility] for admission.    0249 Discussed pt with Dr. Kaufman, [facility], who agrees to admit.    MEDICAL DECISION MAKING  Results were reviewed/discussed with the patient and they were also made aware of online access. Pt also made aware that some labs, such as cultures, will not be resulted during ER visit and follow up with PMD is necessary.     MDM  Number of Diagnoses or Management Options     Amount and/or Complexity of Data Reviewed  Clinical lab tests: reviewed  Tests in the radiology section of CPT®: reviewed (CT head shows no acute abnormalities. CXR shows mild bibasilar atelectasis, but no infiltrates.)  Tests in the medicine section of CPT®: reviewed (EKG)  Decide to obtain previous medical records or to obtain history from someone other than the patient: yes    Patient Progress  Patient progress: stable         DIAGNOSIS  Final diagnoses:   Vertigo (intractable)       DISPOSITION  ADMISSION TO NEURO TELEMETRY    Discussed treatment plan and reason for admission with pt/family and admitting physician.  Pt/family voiced understanding of the plan for admission for further testing/treatment as needed.     Latest Documented Vital Signs:  As of 6:37 AM  BP- 142/64 HR- 78 Temp- 97.7 °F (36.5 °C) (Oral) O2 sat- 94%    --  Documentation assistance provided by korin Hansen III, PA for Shemar Hansen PA-C.  Information recorded by the korin was done at my direction and has  been verified and validated by me.     Nolvia Charles  01/11/19 0255       Lamin Hansen III, PA  01/11/19 0637       Lamin Hansen III, PA  01/11/19 0686

## 2019-01-11 NOTE — PROGRESS NOTES
Discharge Planning Assessment  [facility]     Patient Name: Zaira Menendez  MRN: [id number]  Today's Date: 1/11/2019    Admit Date: 1/10/2019    Discharge Needs Assessment     Row Name 01/11/19 1358       Living Environment    Lives With  alone    Current Living Arrangements  home/apartment/condo    Duration at Residence  few days    Primary Care Provided by  self    Family Caregiver if Needed  child(hazel), adult    Quality of Family Relationships  supportive    Able to Return to Prior Arrangements  yes       Resource/Environmental Concerns    Transportation Concerns  car, none       Transition Planning    Patient/Family Anticipates Transition to  home with help/services    Transportation Anticipated  family or friend will provide;public transportation       Discharge Needs Assessment    Readmission Within the Last 30 Days  no previous admission in last 30 days    Equipment Currently Used at Home  glucometer;rollator;grab bar;shower chair;nebulizer        Discharge Plan     Row Name 01/11/19 6200       Plan    Plan  Plan is home with [facility].    Plan Comments  Spoke with pt at bedside. Facesheet and pharmacy info confirmed.  Pt just moved into an apartment at [facility] [address].  She ambulates with a rollator walker, and also has a nebulizer, glucometer, grab bars and a shower chair at home.   She does not drive.  She uses [employer] for transportation.  She has had [facility] in the past and would want them again.  No hx of SNF.  Discussed that MD recommends vestibular rehab and pt is in agreement with HH.  Per her request, referral called to [facility].          Destination      No service coordination in this encounter.      Durable Medical Equipment      No service coordination in this encounter.      Dialysis/Infusion      No service coordination in this encounter.      Home Medical Care - Selection Complete      Service Provider Request Status Selected Services Address Phone Number Fax  Number    [facility] [address], [address], [place] [address] [phone]       Lisandra Lewis, RN 1/11/2019 1351    Referral to [facility]      No service coordination in this encounter.          Demographic Summary     Row Name 01/11/19 1357       General Information    Admission Type  observation    Arrived From  home    Referral Source  admission list    Reason for Consult  discharge planning    Preferred Language  English        Functional Status     Row Name 01/11/19 7287       Functional Status    Usual Activity Tolerance  moderate    Current Activity Tolerance  moderate       Functional Status, IADL    Medications  independent    Meal Preparation  independent    Housekeeping  independent    Laundry  independent    Shopping  independent       Mental Status    General Appearance WDL  WDL        Psychosocial    No documentation.       Abuse/Neglect    No documentation.       Legal    No documentation.       Substance Abuse    No documentation.       Patient Forms    No documentation.           Lisandra Lewis RN

## 2019-01-11 NOTE — THERAPY DISCHARGE NOTE
Acute Care - Occupational Therapy Initial Eval/Discharge  [facility]     Patient Name: Zaira Menendez  : 1950  MRN: [id number]  Today's Date: 2019  Onset of Illness/Injury or Date of Surgery: 01/10/19            Admit Date: 1/10/2019       ICD-10-CM ICD-9-CM   1. Vertigo (intractable) R42 780.4   2. Difficulty walking R26.2 719.7     Patient Active Problem List   Diagnosis   • Vertigo     Past Medical History:   Diagnosis Date   • Acid reflux    • Anemia    • Asthma    • CHF (congestive heart failure) (CMS/[facility])    • COPD (chronic obstructive pulmonary disease) (CMS/[facility])    • Coronary artery disease    • DVT (deep vein thrombosis) in pregnancy (CMS/[facility])    • GERD (gastroesophageal reflux disease)    • Heart attack (CMS/[facility])    • Hyperlipidemia    • Hypertension    • Kidney failure     not on dialysis     Past Surgical History:   Procedure Laterality Date   • BACK SURGERY     • CARDIAC CATHETERIZATION     • CORONARY ANGIOPLASTY WITH STENT PLACEMENT     • HAND SURGERY     • HYSTERECTOMY     • KNEE SURGERY     • MASTECTOMY, PARTIAL     • TUBAL ABDOMINAL LIGATION            OT ASSESSMENT FLOWSHEET (last 72 hours)      Occupational Therapy Evaluation     Row Name 19 1202                   OT Evaluation Time/Intention    Subjective Information  complains of;pain  -LE        Document Type  evaluation;therapy note (daily note);discharge evaluation/summary  -LE        Patient Effort  good  -LE        Comment  c/o slight dizziness.  c/o pain in hips/back which is chronic and due to see pain mgnt for injections   -LE           General Information    Patient Profile Reviewed?  yes  -LE        Patient Observations  cooperative;alert  -LE        General Observations of Patient  supine in bed.   -LE        Prior Level of Function  independent:;ADL's;transfer just moved into new apartment of Assisted Living  -LE        Equipment Currently Used at Home  bath bench;grab bar rollator  -LE        Pertinent  History of Current Functional Problem  admit with dizziness.   -LE        Existing Precautions/Restrictions  fall  -LE           Relationship/Environment    Concerns About Impact on Relationships  supportive family  -LE           Cognitive Assessment/Intervention- PT/OT    Orientation Status (Cognition)  oriented x 4  -LE        Cognitive Assessment/Intervention Comment  good safety awareness  -LE           Bed Mobility Assessment/Treatment    Supine-Sit [place] (Bed Mobility)  conditional independence  -LE        Sit-Supine [place] (Bed Mobility)  conditional independence  -LE        Assistive Device (Bed Mobility)  bed rails  -LE           Functional Mobility    Functional Mobility- Ind. Level  supervision required  -LE        Functional Mobility- Device  rollator  -LE        Functional Mobility-Distance (Feet)  15 to/from bathroom  -LE        Functional Mobility- Safety Issues  -- leans on rollator (baseline), rocking gait due body habitus  -LE           Transfer Assessment/Treatment    Transfer Assessment/Treatment  toilet transfer;bathtub transfer  -LE        Comment (Transfers)  reinforce hand placement and body mechanics  -LE           Sit-Stand Transfer    Sit-Stand [place] (Transfers)  supervision  -LE        Assistive Device (Sit-Stand Transfers)  walker, 4-wheeled  -LE           Stand-Sit Transfer    Stand-Sit [place] (Transfers)  supervision  -LE        Assistive Device (Stand-Sit Transfers)  walker, front-wheeled  -LE           Toilet Transfer    Type (Toilet Transfer)  stand pivot/stand step  -LE        [place] Level (Toilet Transfer)  supervision  -LE        Assistive Device (Toilet Transfer)  walker, front-wheeled  -LE           Bathtub Transfer    Assistive Device (Bathtub Transfer)  -- verbally review cont using tub seat, pt sits first at home  -LE           ADL Assessment/Intervention    BADL Assessment/Intervention  bathing;upper body dressing;lower body  dressing;grooming;feeding;toileting  -LE           Lower Body Dressing Assessment/Training    Lower Body Dressing [place] Level  don;socks;set up  -LE        Lower Body Dressing Position  edge of bed sitting  -LE           Grooming Assessment/Training    [place] Level (Grooming)  supervision assist only to keep O2 sensor dry  -LE        Grooming Position  supported standing;sink side  -LE           Self-Feeding Assessment/Training    [place] Level (Feeding)  -- denies difficulty  -LE           Toileting Assessment/Training    [place] Level (Toileting)  perform perineal hygiene;supervision  -LE        Toileting Position  unsupported sitting;supported standing  -LE           General ROM    GENERAL ROM COMMENTS  B UE grossly functional for ADL  -LE           Static Sitting Balance    Level of [place] (Unsupported Sitting, Static Balance)  independent  -LE           Static Standing Balance    Level of [place] (Supported Standing, Static Balance)  conditional independence  -LE        Assistive Device Utilized (Supported Standing, Static Balance)  rolling walker  -LE           Positioning and Restraints    Pre-Treatment Position  in bed  -LE        Post Treatment Position  bed  -LE        In Bed  notified nsg;fowlers;call light within reach;encouraged to call for assist ed call assist to get up due IV lines.   -LE           Pain Scale: Numbers Pre/Post-Treatment    Pain Location - Side  Right  -LE        Pain Location  hip  -LE        Pain Intervention(s)  Repositioned;Rest 4/10  -LE           Clinical Impression (OT)    OT Diagnosis  need for assist with personal care  -LE        Criteria for Skilled Therapeutic Interventions Met (OT Eval)  current level of function same as previous level of function  -LE        Therapy Frequency (OT Eval)  evaluation only  -LE        Care Plan Review (OT)  evaluation/treatment results reviewed;care plan/treatment goals reviewed  -LE        Anticipated  Discharge Disposition (OT)  home with assist  -LE           Vital Signs    O2 Delivery Pre Treatment  room air  -LE        Activity Duration  -- at end of OT c/o slight dizziness  -LE           Patient Education Goal (OT)    Activity (Patient Education Goal, OT)  Review home safety and bathroom DME.  -LE        [place]/Cues/Accuracy (Memory Goal 2, OT)  verbalizes understanding  -LE        Time Frame (Patient Education Goal, OT)  1 day  -LE        Progress/Outcome (Patient Education Goal, OT)  goal met  -LE          User Key  (r) = Recorded By, (t) = Taken By, (c) = Cosigned By    Initials Name Effective Dates    ESSIE Bessie Dale, OTR 06/08/18 -           Occupational Therapy Education     Title: PT OT SLP Therapies (In Progress)     Topic: Occupational Therapy (In Progress)     Point: ADL training (Resolved)     Description: Instruct learner(s) on proper safety adaptation and remediation techniques during self care or transfers.   Instruct in proper use of assistive devices.    Learning Progress Summary           Patient LASHAUN Boucher D, ALICELAYA by ESSIE at 1/11/2019 12:01 PM    Comment:  Review home safety, recommend family cont SBA for tub xfer at home. Rec. cont use of bathroom DME. Ed body mechanics during mobility and position of rollator to reduce pain in hips.                   Point: Precautions (Resolved)     Description: Instruct learner(s) on prescribed precautions during self-care and functional transfers.    Learning Progress Summary           Patient LASHAUN BoucherMATEO, ALICE,LAYA by ESSIE at 1/11/2019 12:01 PM    Comment:  Review home safety, recommend family cont SBA for tub xfer at home. Rec. cont use of bathroom DME. Ed body mechanics during mobility and position of rollator to reduce pain in hips.                   Point: Body mechanics (Resolved)     Description: Instruct learner(s) on proper positioning and spine alignment during self-care, functional mobility activities and/or exercises.    Learning Progress  Summary           Patient Citlaly, LASHAUN,MATEO, ALICE,VU by ESSIE at 1/11/2019 12:01 PM    Comment:  Review home safety, recommend family cont SBA for tub xfer at home. Rec. cont use of bathroom DME. Ed body mechanics during mobility and position of rollator to reduce pain in hips.                               User Key     Initials Effective Dates Name Provider Type Discipline    ESSIE 06/08/18 -  Bessie Dale, OTR Occupational Therapist OT                OT Recommendation and Plan  Outcome Summary/Treatment Plan (OT)  Anticipated Discharge Disposition (OT): home with assist  Therapy Frequency (OT Eval): evaluation only  Plan of Care Review  Plan of Care Reviewed With: patient  Plan of Care Reviewed With: patient  Outcome Summary: Pt presents from home with dizziness. Pt is SBA to get OOB, ambulate to/from bathroom and ADL tasks at/near baseline level. Pt denies ADL concerns for d/c home. Education on home safety and body mechanics given. No further skilled acute care OT needs.      Rehab Goal Summary     Row Name 01/11/19 1202 01/11/19 1141          Physical Therapy Goals    Bed Mobility Goal Selection (PT)  --  bed mobility, PT goal 1  -EF     Transfer Goal Selection (PT)  --  transfer, PT goal 1  -EF     Gait Training Goal Selection (PT)  --  gait training, PT goal 1  -EF        Bed Mobility Goal 1 (PT)    Activity/Assistive Device (Bed Mobility Goal 1, PT)  --  bed mobility activities, all  -EF     [place] Level/Cues Needed (Bed Mobility Goal 1, PT)  --  conditional independence  -EF     Time Frame (Bed Mobility Goal 1, PT)  --  1 week  -EF        Transfer Goal 1 (PT)    Activity/Assistive Device (Transfer Goal 1, PT)  --  transfers, all;walker, rolling  -EF     [place] Level/Cues Needed (Transfer Goal 1, PT)  --  conditional independence  -EF     Time Frame (Transfer Goal 1, PT)  --  1 week  -EF        Gait Training Goal 1 (PT)    Activity/Assistive Device (Gait Training Goal 1, PT)  --  gait (walking  locomotion);assistive device use;walker, rolling  -EF     [place] Level (Gait Training Goal 1, PT)  --  conditional independence  -EF     Distance (Gait Goal 1, PT)  --  120 x 2  -EF     Time Frame (Gait Training Goal 1, PT)  --  1 week  -EF        Patient Education Goal (OT)    Activity (Patient Education Goal, OT)  Review home safety and bathroom DME.  -LE  --     [place]/Cues/Accuracy (Memory Goal 2, OT)  verbalizes understanding  -LE  --     Time Frame (Patient Education Goal, OT)  1 day  -LE  --     Progress/Outcome (Patient Education Goal, OT)  goal met  -LE  --       User Key  (r) = Recorded By, (t) = Taken By, (c) = Cosigned By    Initials Name Provider Type Discipline    Bessie Myrick, OTR Occupational Therapist OT    EF Chuyita Yoo, PT Physical Therapist PT          Outcome Measures     Row Name 01/11/19 1157 01/11/19 1100          How much help from another person do you currently need...    Turning from your back to your side while in flat bed without using bedrails?  --  3  -EF     Moving from lying on back to sitting on the side of a flat bed without bedrails?  --  3  -EF     Moving to and from a bed to a chair (including a wheelchair)?  --  3  -EF     Standing up from a chair using your arms (e.g., wheelchair, bedside chair)?  --  3  -EF     Climbing 3-5 steps with a railing?  --  2  -EF     To walk in hospital room?  --  3  -EF     AM-[facility] 6 Clicks Score  --  17  -EF        How much help from another is currently needed...    Putting on and taking off regular lower body clothing?  3  -LE  --     Bathing (including washing, rinsing, and drying)  3  -LE  --     Toileting (which includes using toilet bed pan or urinal)  3  -LE  --     Putting on and taking off regular upper body clothing  3  -LE  --     Taking care of personal grooming (such as brushing teeth)  3  -LE  --     Eating meals  4  -LE  --     Score  19  -LE  --        Functional Assessment    Outcome Measure Options  AM-PAC  6 Clicks Daily Activity (OT)  -LE  AM-PAC 6 Clicks Basic Mobility (PT)  -EF       User Key  (r) = Recorded By, (t) = Taken By, (c) = Cosigned By    Initials Name Provider Type    Bessie Myrick OTR Occupational Therapist    Chuyita Haas PT Physical Therapist          Time Calculation:   Time Calculation- OT     Row Name 01/11/19 1215             Time Calculation- OT    OT Start Time  0953  -LE      OT Stop Time  1015  -LE      OT Time Calculation (min)  22 min  -LE      Total Timed Code Minutes- OT  12 minute(s)  -LE      OT Received On  01/11/19  -LE        User Key  (r) = Recorded By, (t) = Taken By, (c) = Cosigned By    Initials Name Provider Type    Bessie Myrick OTR Occupational Therapist        Therapy Suggested Charges     Code   Minutes Charges    None           Therapy Charges for Today     Code Description Service Date Service Provider Modifiers Qty    [id number]  OT EVAL LOW COMPLEXITY 2 1/11/2019 Bessie Dale OTR GO 1    [id number]  OT SELF CARE/MGMT/TRAIN EA 15 MIN 1/11/2019 Bessie Dale OTR GO 1               OT Discharge Summary  Anticipated Discharge Disposition (OT): home with assist    SULTANA Pacheco  1/11/2019

## 2019-01-11 NOTE — PLAN OF CARE
Problem: Patient Care Overview  Goal: Plan of Care Review  Outcome: Ongoing (interventions implemented as appropriate)   01/11/19 9508   Coping/Psychosocial   Plan of Care Reviewed With patient   OTHER   Outcome Summary Pt presents from home with dizziness. Pt is SBA to get OOB, ambulate to/from bathroom and ADL tasks at/near baseline level. Pt denies ADL concerns for d/c home. Education on home safety and body mechanics given. No further skilled acute care OT needs.

## 2019-01-11 NOTE — CONSULTS
Neurology Consult Note    Consult Date: 1/11/2019    Referring MD: Zion Kaufman MD    Reason for Consult I have been asked to see the patient in neurological consultation to render advice and opinion regarding vertigo    Zaira Menendez is a 68 y.o. female with PMH of CHF, COPD, CAD, HTN, HLD. She had a flu like illness 2 weeks ago. 3 days ago she developed gradually worsening vertigo. She experienced this as a sensation of rocking backing and forward. This got worse with movement or change in head position. Yesterday she had nausea and vomiting associated with it. She has no prior history of vertigo.     Past Medical/Surgical Hx:  Past Medical History:   Diagnosis Date   • Acid reflux    • Anemia    • Asthma    • CHF (congestive heart failure) (CMS/[facility])    • COPD (chronic obstructive pulmonary disease) (CMS/[facility])    • Coronary artery disease    • DVT (deep vein thrombosis) in pregnancy (CMS/[facility])    • GERD (gastroesophageal reflux disease)    • Heart attack (CMS/[facility])    • Hyperlipidemia    • Hypertension    • Kidney failure     not on dialysis     Past Surgical History:   Procedure Laterality Date   • BACK SURGERY     • CARDIAC CATHETERIZATION     • CORONARY ANGIOPLASTY WITH STENT PLACEMENT     • HAND SURGERY     • HYSTERECTOMY     • KNEE SURGERY     • MASTECTOMY, PARTIAL     • TUBAL ABDOMINAL LIGATION         Medications On Admission  Medications Prior to Admission   Medication Sig Dispense Refill Last Dose   • acetaminophen (TYLENOL) 650 MG 8 hr tablet Take 650 mg by mouth Every 8 (Eight) Hours As Needed for Mild Pain .      • albuterol (PROAIR RESPICLICK) 108 (90 Base) MCG/ACT inhaler Inhale 2 puffs Every 4 (Four) Hours As Needed for Wheezing.      • amLODIPine (NORVASC) 10 MG tablet Take 10 mg by mouth Daily.      • aspirin 81 MG EC tablet Take 81 mg by mouth Daily.      • budesonide (PULMICORT) 0.5 MG/2ML nebulizer solution Take 0.5 mg by nebulization 2 (Two) Times a Day.      • Cholecalciferol (VITAMIN  D3) 5000 units capsule capsule Take 5,000 Units by mouth Daily.      • Dextromethorphan-Guaifenesin (MUCINEX DM PO) Take 1,200 mg by mouth.      • diazePAM (VALIUM) 5 MG tablet Take 5 mg by mouth 2 (Two) Times a Day As Needed for Anxiety.      • diclofenac (VOLTAREN) 1 % gel gel Apply 4 g topically to the appropriate area as directed 4 (Four) Times a Day As Needed.      • febuxostat (ULORIC) 40 MG tablet Take 40 mg by mouth Daily.      • ferrous sulfate 325 (65 FE) MG tablet Take 325 mg by mouth 2 (Two) Times a Day.      • Fexofenadine HCl (MUCINEX ALLERGY PO) Take 120 mg by mouth 2 (Two) Times a Day As Needed.      • furosemide (LASIX) 40 MG tablet Take 40 mg by mouth 2 (Two) Times a Day As Needed.      • gabapentin (NEURONTIN) 600 MG tablet Take 600 mg by mouth 3 (Three) Times a Day As Needed.      • HYDROcodone-acetaminophen (NORCO)  MG per tablet Take 1 tablet by mouth Every 6 (Six) Hours As Needed for Moderate Pain .      • ipratropium-albuterol (DUO-NEB) 0.5-2.5 mg/3 ml nebulizer Take 3 mL by nebulization Every 4 (Four) Hours As Needed for Wheezing.      • magnesium oxide (MAG-OX) 400 tablet tablet Take 400 mg by mouth 2 (Two) Times a Day As Needed.      • nitroglycerin (NITROLINGUAL) 0.4 MG/SPRAY spray Place 1 spray under the tongue Every 5 (Five) Minutes As Needed for Chest Pain.      • omeprazole (priLOSEC) 40 MG capsule Take 40 mg by mouth 2 (Two) Times a Day.      • polyethyl glycol-propyl glycol (SYSTANE) 0.4-0.3 % solution ophthalmic solution At Night As Needed.      • promethazine (PHENERGAN) 25 MG tablet Take 25 mg by mouth 2 (Two) Times a Day As Needed for Nausea or Vomiting.      • promethazine-codeine (PHENERGAN with CODEINE) 6.25-10 MG/5ML syrup Take 5 mL by mouth Every 4 (Four) Hours As Needed for Cough.      • propranolol (INDERAL) 10 MG tablet Take 5 mg by mouth Daily As Needed.      • rosuvastatin (CRESTOR) 20 MG tablet Take 20 mg by mouth Daily.      • valsartan (DIOVAN) 80 MG tablet  "Take 320 mg by mouth Daily.          Allergies:  Allergies   Allergen Reactions   • Morphine Hives, Itching and Rash   • Ibuprofen Other (See Comments)   • Naproxen Other (See Comments)   • Baclofen Other (See Comments)     PER PT   • Latex Unknown (See Comments)       Social Hx:  Social History     Socioeconomic History   • Marital status:      Spouse name: Not on file   • Number of children: Not on file   • Years of education: Not on file   • Highest education level: Not on file   Social Needs   • Financial resource strain: Not on file   • Food insecurity - worry: Not on file   • Food insecurity - inability: Not on file   • Transportation needs - medical: Not on file   • Transportation needs - non-medical: Not on file   Occupational History   • Not on file   Tobacco Use   • Smoking status: Former Smoker   • Tobacco comment: CAFF USE   Substance and Sexual Activity   • Alcohol use: No     Frequency: Never   • Drug use: Not on file   • Sexual activity: Not on file   Other Topics Concern   • Not on file   Social History Narrative   • Not on file       Family Hx:  Family History   Problem Relation Age of Onset   • Hypertension Mother    • Diabetes Mother    • Heart disease Father 59   • Sudden death Father    • Hypertension Father    • Aneurysm Father    • Stroke Sister    • Hypertension Sister    • Hypertension Brother    • Cancer Brother    • Cancer Maternal Grandmother        Review of Systems  No CP, SOA  No fevers, chills  No weakness, + vertigo  + double vision, + chronic decrease in visual acuity    Exam    /67 (BP Location: Left arm, Patient Position: Lying)   Pulse 78   Temp 98 °F (36.7 °C) (Oral)   Resp 20   Ht 152.4 cm (60\")   Wt 108 kg (237 lb 3.4 oz)   SpO2 97%   BMI 46.33 kg/m²   gen: NAD, vitals reviewed  MS: oriented x3, recent/remote memory intact, normal attention/concentration, language intact, no neglect, normal fund of knowledge  CN: visual acuity grossly normal, visual fields " full, PERRL, EOMI with nystagmus on end gaze, fast phase to the right, facial sensation equal, no facial droop, hearing symmetric, palate elevates symmetrically, shoulder shrug equal, tongue midline  Motor: 5/5 throughout upper and lower extremities, normal tone  Sensation: intact to vibration and temperature throughout  Reflexes: 2+ throughout upper and lower extremities, downgoing plantars  Coordination: no dysmetria with finger to nose bilaterally    DATA:    Lab Results   Component Value Date    GLUCOSE 143 (H) 01/11/2019    CALCIUM 9.8 01/11/2019     01/11/2019    K 3.8 01/11/2019    CO2 27.8 01/11/2019     01/11/2019    BUN 9 01/11/2019    CREATININE 0.89 01/11/2019    EGFRIFAFRI 76 01/11/2019    BCR 10.1 01/11/2019    ANIONGAP 12.2 01/11/2019     Lab Results   Component Value Date    WBC 4.87 01/11/2019    HGB 11.6 (L) 01/11/2019    HCT 37.2 01/11/2019    MCV 93.9 01/11/2019     01/11/2019     No results found for: CHOL  Lab Results   Component Value Date    HDL 59 01/16/2014     Lab Results   Component Value Date    LDL 45 01/16/2014     Lab Results   Component Value Date    TRIG 106 01/16/2014     No results found for: HGBA1C  No results found for: INR, PROTIME    Lab review: CBC, CMP wnl    Imaging review: I reviewed a prior MRI 10/18 which was normal. CTH from the ED appears normal except for generalize atrophy. Radiology report reviewed.    D/w Dr. Gill regarding diagnosis and plan below.    Impression:  1) Vestibular neuritis, left ear  2) Chronic obstructive pulmonary disease  3) Essential hypertension    Comment: History and exam most consistent with left vestibular neuritis. BPPV is also possible. No red flags on history and exam for stroke. I don't think she needs an MRI. Would recommend prednisone 60 x 5 days for vestibular neuritis and she will need vestibular rehab on an outpatient basis.    PLAN:  1. Prednisone 60 x 5 days, no taper  2. Vestibular rehab after discharge    Ok  to go home today from my standpoint.

## 2019-01-11 NOTE — THERAPY EVALUATION
Acute Care - Physical Therapy Initial Evaluation  [facility]     Patient Name: Zaira Menendez  : 1950  MRN: [id number]  Today's Date: 2019   Onset of Illness/Injury or Date of Surgery: 01/10/19  Date of Referral to PT: 19         Admit Date: 1/10/2019    Visit Dx:     ICD-10-CM ICD-9-CM   1. Vertigo (intractable) R42 780.4   2. Difficulty walking R26.2 719.7     Patient Active Problem List   Diagnosis   • Vertigo     Past Medical History:   Diagnosis Date   • Acid reflux    • Anemia    • Asthma    • CHF (congestive heart failure) (CMS/[facility])    • COPD (chronic obstructive pulmonary disease) (CMS/[facility])    • Coronary artery disease    • DVT (deep vein thrombosis) in pregnancy (CMS/[facility])    • GERD (gastroesophageal reflux disease)    • Heart attack (CMS/[facility])    • Hyperlipidemia    • Hypertension    • Kidney failure     not on dialysis     Past Surgical History:   Procedure Laterality Date   • BACK SURGERY     • CARDIAC CATHETERIZATION     • CORONARY ANGIOPLASTY WITH STENT PLACEMENT     • HAND SURGERY     • HYSTERECTOMY     • KNEE SURGERY     • MASTECTOMY, PARTIAL     • TUBAL ABDOMINAL LIGATION          PT ASSESSMENT (last 12 hours)      Physical Therapy Evaluation     Row Name 19 1141          PT Evaluation Time/Intention    Subjective Information  complains of;pain;dizziness slight dizziness  -EF     Document Type  evaluation  -EF     Mode of Treatment  physical therapy  -EF     Patient Effort  good  -EF     Symptoms Noted During/After Treatment  increased pain  -EF     Row Name 19 1141          General Information    Patient Profile Reviewed?  yes  -EF     Onset of Illness/Injury or Date of Surgery  01/10/19  -EF     Patient Observations  alert;cooperative;agree to therapy  -EF     General Observations of Patient  supine in bed, IV  -EF     Prior Level of Function  independent:  -EF     Equipment Currently Used at Home  rollator  -EF     Pertinent History of Current Functional Problem   Pt adm with dizziness with movement, abd pain, chest pain, N/V.  -EF     Existing Precautions/Restrictions  fall  -EF     Row Name 01/11/19 1141          Cognitive Assessment/Intervention- PT/OT    Orientation Status (Cognition)  oriented x 4  -EF     Follows Commands (Cognition)  WNL  -EF     Row Name 01/11/19 1141          Bed Mobility Assessment/Treatment    Bed Mobility Assessment/Treatment  supine-sit;sit-supine  -EF     Supine-Sit [place] (Bed Mobility)  conditional independence  -EF     Sit-Supine [place] (Bed Mobility)  conditional independence  -EF     Row Name 01/11/19 1141          Transfer Assessment/Treatment    Transfer Assessment/Treatment  sit-stand transfer;stand-sit transfer  -EF     Sit-Stand [place] (Transfers)  supervision  -EF     Stand-Sit [place] (Transfers)  supervision  -EF     Row Name 01/11/19 1141          Sit-Stand Transfer    Assistive Device (Sit-Stand Transfers)  walker, 4-wheeled  -EF     Row Name 01/11/19 1141          Stand-Sit Transfer    Assistive Device (Stand-Sit Transfers)  walker, 4-wheeled  -EF     Row Name 01/11/19 1141          Gait/Stairs Assessment/Training    [place] Level (Gait)  supervision  -EF     Assistive Device (Gait)  walker, 4-wheeled  -EF     Distance in Feet (Gait)  80 x 2  -EF     Deviations/Abnormal Patterns (Gait)  gait speed decreased;stride length decreased  -EF     Bilateral Gait Deviations  forward flexed posture  -EF     Row Name 01/11/19 1141          General ROM    GENERAL ROM COMMENTS  grossly WFL  -EF     Row Name 01/11/19 1141          MMT (Manual Muscle Testing)    General MMT Comments  3/5 or greater throughout  -EF     Row Name 01/11/19 1141          Motor Assessment/Intervention    Additional Documentation  Balance (Group);Balance Interventions (Group)  -EF     Row Name 01/11/19 1141          Balance    Balance  static sitting balance;static standing balance;dynamic standing balance  -EF     Row Name 01/11/19 1141           Static Sitting Balance    Level of [place] (Unsupported Sitting, Static Balance)  independent  -EF     Sitting Position (Unsupported Sitting, Static Balance)  sitting on edge of bed  -EF     Row Name 01/11/19 1141          Static Standing Balance    Level of [place] (Supported Standing, Static Balance)  conditional independence;supervision  -EF     Assistive Device Utilized (Supported Standing, Static Balance)  rolling walker  -EF     Row Name 01/11/19 1141          Pain Assessment    Additional Documentation  Pain Scale: Word Pre/Post-Treatment (Group)  -EF     Row Name 01/11/19 1141          Pain Scale: Numbers Pre/Post-Treatment    Pain Location - Side  Right  -EF     Pain Location  hip  -EF     Pain Intervention(s)  Repositioned;Ambulation/increased activity  -EF     Row Name 01/11/19 1141          Pain Scale: Word Pre/Post-Treatment    Pain: Word Scale, Pretreatment  2 - mild pain  -EF     Pain: Word Scale, Post-Treatment  6 - moderate-severe pain during movement  -EF     Row Name 01/11/19 1141          Physical Therapy Clinical Impression    Date of Referral to PT  01/11/19  -EF     Criteria for Skilled Interventions Met (PT Clinical Impression)  yes;treatment indicated  -EF     Rehab Potential (PT Clinical Summary)  good, to achieve stated therapy goals  -EF     Row Name 01/11/19 1141          Physical Therapy Goals    Bed Mobility Goal Selection (PT)  bed mobility, PT goal 1  -EF     Transfer Goal Selection (PT)  transfer, PT goal 1  -EF     Gait Training Goal Selection (PT)  gait training, PT goal 1  -EF     Row Name 01/11/19 1141          Bed Mobility Goal 1 (PT)    Activity/Assistive Device (Bed Mobility Goal 1, PT)  bed mobility activities, all  -EF     [place] Level/Cues Needed (Bed Mobility Goal 1, PT)  conditional independence  -EF     Time Frame (Bed Mobility Goal 1, PT)  1 week  -EF     Row Name 01/11/19 1141          Transfer Goal 1 (PT)    Activity/Assistive Device (Transfer  Goal 1, PT)  transfers, all;walker, rolling  -EF     [place] Level/Cues Needed (Transfer Goal 1, PT)  conditional independence  -EF     Time Frame (Transfer Goal 1, PT)  1 week  -EF     Row Name 01/11/19 1141          Gait Training Goal 1 (PT)    Activity/Assistive Device (Gait Training Goal 1, PT)  gait (walking locomotion);assistive device use;walker, rolling  -EF     [place] Level (Gait Training Goal 1, PT)  conditional independence  -EF     Distance (Gait Goal 1, PT)  120 x 2  -EF     Time Frame (Gait Training Goal 1, PT)  1 week  -EF     Row Name 01/11/19 1141          Positioning and Restraints    Pre-Treatment Position  in bed  -EF     Post Treatment Position  bed  -EF     In Bed  supine;call light within reach;encouraged to call for assist  -EF       User Key  (r) = Recorded By, (t) = Taken By, (c) = Cosigned By    Initials Name Provider Type    Chuyita Haas PT Physical Therapist        Physical Therapy Education     Title: PT OT SLP Therapies (Done)     Topic: Physical Therapy (Done)     Point: Mobility training (Done)     Learning Progress Summary           Patient Acceptance, E, VU,DU by  at 1/11/2019 11:49 AM                   Point: Home exercise program (Done)     Learning Progress Summary           Patient Acceptance, E, VU,DU by  at 1/11/2019 11:49 AM                   Point: Body mechanics (Done)     Learning Progress Summary           Patient Acceptance, E, VU,DU by  at 1/11/2019 11:49 AM                   Point: Precautions (Done)     Learning Progress Summary           Patient Acceptance, E, VU,DU by  at 1/11/2019 11:49 AM                               User Key     Initials Effective Dates Name Provider Type [facility] 06/08/18 -  Chuyita Yoo PT Physical Therapist PT              PT Recommendation and Plan  Anticipated Discharge Disposition (PT): home, home with OP services, home with home health(Vestibular therapy at discharge)  Therapy Frequency (PT  Clinical Impression): daily  Outcome Summary/Treatment Plan (PT)  Anticipated Discharge Disposition (PT): home, home with OP services, home with home health(Vestibular therapy at discharge)  Plan of Care Reviewed With: patient  Outcome Summary: Pt is 67 yo female adm with dizziness with movement, abd & chest pain, N/V. Presents today with slight c/o of dizziness both at rest in bed & during movements. Pt able to perform bed mob, txfs, and ambulation safely without increase in symptoms. She will benefit from outpatient or home health vestibular rehab per Dr. Cantu's orders to address her symptoms.  Outcome Measures     Row Name 01/11/19 1100             How much help from another person do you currently need...    Turning from your back to your side while in flat bed without using bedrails?  3  -EF      Moving from lying on back to sitting on the side of a flat bed without bedrails?  3  -EF      Moving to and from a bed to a chair (including a wheelchair)?  3  -EF      Standing up from a chair using your arms (e.g., wheelchair, bedside chair)?  3  -EF      Climbing 3-5 steps with a railing?  2  -EF      To walk in hospital room?  3  -EF      AM-PAC 6 Clicks Score  17  -EF         Functional Assessment    Outcome Measure Options  AM-PAC 6 Clicks Basic Mobility (PT)  -EF        User Key  (r) = Recorded By, (t) = Taken By, (c) = Cosigned By    Initials Name Provider Type    Chuyita Haas PT Physical Therapist         Time Calculation:   PT Charges     Row Name 01/11/19 1153             Time Calculation    Start Time  1117  -EF      Stop Time  1137  -EF      Time Calculation (min)  20 min  -EF      PT Received On  01/11/19  -EF      PT - Next Appointment  01/12/19  -EF      PT Goal Re-Cert Due Date  01/18/19  -EF        User Key  (r) = Recorded By, (t) = Taken By, (c) = Cosigned By    Initials Name Provider Type    Chuyita Haas PT Physical Therapist        Therapy Suggested Charges     Code    Minutes Charges    None           Therapy Charges for Today     Code Description Service Date Service Provider Modifiers Qty    36547839330 HC PT EVAL LOW COMPLEXITY 2 1/11/2019 Chuyita Yoo, PT GP 1    78752387877 HC PT THER PROC EA 15 MIN 1/11/2019 Chuyita Yoo, PT GP 1          PT G-Codes  Outcome Measure Options: AM-PAC 6 Clicks Basic Mobility (PT)  AM-PAC 6 Clicks Score: 17      Chuyita Yoo, PT  1/11/2019

## 2019-01-12 PROCEDURE — 25010000002 ENOXAPARIN PER 10 MG: Performed by: INTERNAL MEDICINE

## 2019-01-12 PROCEDURE — 63710000001 PREDNISONE PER 5 MG: Performed by: HOSPITALIST

## 2019-01-12 PROCEDURE — G0378 HOSPITAL OBSERVATION PER HR: HCPCS

## 2019-01-12 PROCEDURE — 94799 UNLISTED PULMONARY SVC/PX: CPT

## 2019-01-12 PROCEDURE — 99214 OFFICE O/P EST MOD 30 MIN: CPT | Performed by: NURSE PRACTITIONER

## 2019-01-12 PROCEDURE — 96372 THER/PROPH/DIAG INJ SC/IM: CPT

## 2019-01-12 PROCEDURE — 96361 HYDRATE IV INFUSION ADD-ON: CPT

## 2019-01-12 RX ORDER — MELATONIN
5000 DAILY
Status: DISCONTINUED | OUTPATIENT
Start: 2019-01-12 | End: 2019-01-13 | Stop reason: HOSPADM

## 2019-01-12 RX ORDER — MECLIZINE HYDROCHLORIDE 25 MG/1
25 TABLET ORAL 3 TIMES DAILY PRN
Qty: 20 TABLET
Start: 2019-01-12 | End: 2019-01-13

## 2019-01-12 RX ORDER — PANTOPRAZOLE SODIUM 40 MG/1
40 TABLET, DELAYED RELEASE ORAL
Status: DISCONTINUED | OUTPATIENT
Start: 2019-01-12 | End: 2019-01-13 | Stop reason: HOSPADM

## 2019-01-12 RX ORDER — GUAIFENESIN 600 MG/1
1200 TABLET, EXTENDED RELEASE ORAL EVERY 12 HOURS SCHEDULED
Status: DISCONTINUED | OUTPATIENT
Start: 2019-01-12 | End: 2019-01-13 | Stop reason: HOSPADM

## 2019-01-12 RX ORDER — PROMETHAZINE HYDROCHLORIDE 25 MG/ML
25 INJECTION, SOLUTION INTRAMUSCULAR; INTRAVENOUS EVERY 6 HOURS PRN
Status: DISCONTINUED | OUTPATIENT
Start: 2019-01-12 | End: 2019-01-13 | Stop reason: HOSPADM

## 2019-01-12 RX ORDER — FUROSEMIDE 40 MG/1
40 TABLET ORAL DAILY
Status: DISCONTINUED | OUTPATIENT
Start: 2019-01-12 | End: 2019-01-13 | Stop reason: HOSPADM

## 2019-01-12 RX ORDER — GABAPENTIN 400 MG/1
1200 CAPSULE ORAL EVERY 8 HOURS SCHEDULED
Status: DISCONTINUED | OUTPATIENT
Start: 2019-01-12 | End: 2019-01-13 | Stop reason: HOSPADM

## 2019-01-12 RX ORDER — PREDNISONE 20 MG/1
60 TABLET ORAL
Qty: 12 TABLET | Refills: 0 | Status: SHIPPED | OUTPATIENT
Start: 2019-01-12 | End: 2019-01-13

## 2019-01-12 RX ORDER — FERROUS SULFATE 325(65) MG
325 TABLET ORAL 2 TIMES DAILY WITH MEALS
Status: DISCONTINUED | OUTPATIENT
Start: 2019-01-12 | End: 2019-01-13 | Stop reason: HOSPADM

## 2019-01-12 RX ADMIN — FERROUS SULFATE TAB 325 MG (65 MG ELEMENTAL FE) 325 MG: 325 (65 FE) TAB at 21:15

## 2019-01-12 RX ADMIN — VITAMIN D, TAB 1000IU (100/BT) 5000 UNITS: 25 TAB at 21:14

## 2019-01-12 RX ADMIN — HYDROCODONE BITARTRATE AND ACETAMINOPHEN 1 TABLET: 10; 325 TABLET ORAL at 04:01

## 2019-01-12 RX ADMIN — ENOXAPARIN SODIUM 40 MG: 40 INJECTION SUBCUTANEOUS at 09:29

## 2019-01-12 RX ADMIN — PANTOPRAZOLE SODIUM 40 MG: 40 TABLET, DELAYED RELEASE ORAL at 21:14

## 2019-01-12 RX ADMIN — PREDNISONE 60 MG: 50 TABLET ORAL at 09:27

## 2019-01-12 RX ADMIN — GUAIFENESIN 1200 MG: 600 TABLET, EXTENDED RELEASE ORAL at 21:14

## 2019-01-12 RX ADMIN — ASPIRIN 81 MG: 81 TABLET, DELAYED RELEASE ORAL at 09:28

## 2019-01-12 RX ADMIN — SODIUM CHLORIDE 75 ML/HR: 9 INJECTION, SOLUTION INTRAVENOUS at 06:03

## 2019-01-12 RX ADMIN — BUDESONIDE 0.5 MG: 0.5 INHALANT RESPIRATORY (INHALATION) at 20:30

## 2019-01-12 RX ADMIN — ROSUVASTATIN CALCIUM 20 MG: 20 TABLET, FILM COATED ORAL at 09:28

## 2019-01-12 RX ADMIN — BUDESONIDE 0.5 MG: 0.5 INHALANT RESPIRATORY (INHALATION) at 10:49

## 2019-01-12 RX ADMIN — SODIUM CHLORIDE, PRESERVATIVE FREE 3 ML: 5 INJECTION INTRAVENOUS at 21:15

## 2019-01-12 RX ADMIN — HYDROCODONE BITARTRATE AND ACETAMINOPHEN 1 TABLET: 10; 325 TABLET ORAL at 21:26

## 2019-01-12 RX ADMIN — MECLIZINE HYDROCHLORIDE 25 MG: 25 TABLET ORAL at 04:01

## 2019-01-12 RX ADMIN — GABAPENTIN 1200 MG: 400 CAPSULE ORAL at 21:14

## 2019-01-12 RX ADMIN — AMLODIPINE BESYLATE 10 MG: 10 TABLET ORAL at 09:28

## 2019-01-12 RX ADMIN — PANTOPRAZOLE SODIUM 40 MG: 40 TABLET, DELAYED RELEASE ORAL at 06:03

## 2019-01-12 RX ADMIN — POLYETHYLENE GLYCOL 3350 17 G: 17 POWDER, FOR SOLUTION ORAL at 21:15

## 2019-01-12 RX ADMIN — BACITRACIN ZINC AND POLYMYXIN B SULFATE: 500; 10000 OINTMENT OPHTHALMIC at 21:15

## 2019-01-12 RX ADMIN — VALSARTAN 320 MG: 320 TABLET ORAL at 09:28

## 2019-01-12 RX ADMIN — Medication 400 MG: at 21:15

## 2019-01-12 RX ADMIN — FEBUXOSTAT 40 MG: 40 TABLET ORAL at 09:28

## 2019-01-12 NOTE — SIGNIFICANT NOTE
01/12/19 1310   Rehab Treatment   Discipline physical therapist   Reason Treatment Not Performed patient/family declined treatment  (Patient refused stating she is still eating. PT informed patient PT may not make it back to check again today due to heavy caseload and pt still refused saying to come back tomorrow. Nurse present and okay with PT following up tomorrow. )   Recommendation   PT - Next Appointment 01/13/19

## 2019-01-12 NOTE — PLAN OF CARE
Problem: Patient Care Overview  Goal: Plan of Care Review  Outcome: Ongoing (interventions implemented as appropriate)   01/12/19 0558   Coping/Psychosocial   Plan of Care Reviewed With patient   Plan of Care Review   Progress improving   OTHER   Outcome Summary Pt was medicated as ordered & tolerated well, vss, I will continue to monitor.     Goal: Individualization and Mutuality  Outcome: Ongoing (interventions implemented as appropriate)    Goal: Discharge Needs Assessment  Outcome: Ongoing (interventions implemented as appropriate)    Goal: Interprofessional Rounds/Family Conf  Outcome: Ongoing (interventions implemented as appropriate)      Problem: Fall Risk (Adult)  Goal: Identify Related Risk Factors and Signs and Symptoms  Outcome: Ongoing (interventions implemented as appropriate)    Goal: Absence of Fall  Outcome: Ongoing (interventions implemented as appropriate)

## 2019-01-12 NOTE — DISCHARGE SUMMARY
[facility]  [phone]    DISCHARGE SUMMARY  [facility]    Patient Identification:  Name: Zaira Menendez  Age: 68 y.o.  Sex: female  :  1950  MRN: [id number]  Primary Care Physician: Alessandra Willingham MD    Admit date: 1/10/2019  Discharge date and time:      Discharge Diagnoses:  Vertigo       History of present illness from H&P:    Ms. Menendez is a 68 y.o.  with a history of HTN, CHF, COPD HLD, GERD and h/o DVT ( in the left arm at [facility] and no blood clots since) who presents to [facility] complaining of persistent dizziness. About two weeks ago she developed a viral infection, leading to the onset of her symptoms. During this time she felt light-headed and described the feeling as being in a wave-like state, worse when standing. Yesterday her symptoms worsened and she had associated nausea, vomiting, HA, subjective fever and chills. She felt as if the room was spinning even while lying down.        Hospital Course:     Dizziness has improved with steroids and she is feeling much better.  She was seen by neurology that felt this was consistent with left Vestibular neuritis or possible BPPV.  No red flags per neurology and she just had an MRI in October so neurology does not think a repeat mri is necessary      Further hospital course by problem list:  · Vestibular neuritis secondary to recent viral infection  ? Neurology following, appreciate recommendations   ? PO prednisone  § monitor blood sugars closely as she states h/o oral steroids and hyperglycemia  ? Vestibular rehab outpatient   · Vomited prior to coming to the hospital, better; continue with the prilosec  · HTN  · COPD  · CKD  · HLD          Consults:   Consults     Date and Time Order Name Status Description    2019 0423 Inpatient Neurology Consult General Completed     2019 0217 [facility] (on-call MD unless specified) Completed           Results from last 7  days   Lab Units  01/11/19   0432   WBC 10*3/mm3  4.87   HEMOGLOBIN g/dL  11.6*   HEMATOCRIT %  37.2   PLATELETS 10*3/mm3  250       Results from last 7 days   Lab Units  01/11/19   0432   SODIUM mmol/L  140   POTASSIUM mmol/L  3.8   CHLORIDE mmol/L  100   CO2 mmol/L  27.8   BUN mg/dL  9   CREATININE mg/dL  0.89   GLUCOSE mg/dL  143*   CALCIUM mg/dL  9.8       Significant Diagnostic Studies:   Lab Results   Component Value Date    WBC 4.87 01/11/2019    HGB 11.6 (L) 01/11/2019    HCT 37.2 01/11/2019     01/11/2019     Lab Results   Component Value Date     01/11/2019    K 3.8 01/11/2019     01/11/2019    CO2 27.8 01/11/2019    BUN 9 01/11/2019    CREATININE 0.89 01/11/2019    GLUCOSE 143 (H) 01/11/2019     Lab Results   Component Value Date    CALCIUM 9.8 01/11/2019     Lab Results   Component Value Date    AST 18 01/11/2019    ALT 16 01/11/2019    ALKPHOS 66 01/11/2019     No results found for: APTT, INR  No results found for: COLORU, CLARITYU, SPECGRAV, PHUR, PROTEINUR, GLUCOSEU, KETONESU, BLOODU, NITRITE, LEUKOCYTESUR, BILIRUBINUR, UROBILINOGEN, RBCUA, WBCUA, BACTERIA, UACOMMENT  Lab Results   Component Value Date    TROPONINT <0.010 01/10/2019     No components found for: HGBA1C;2  No components found for: TSH;2    Imaging Results (all)     Procedure Component Value Units Date/Time    CT Head Without Contrast [[id number]] Collected:  01/11/19 0110     Updated:  01/11/19 0116    Narrative:       CT OF THE HEAD WITHOUT CONTRAST     HISTORY: Headache and vertigo     COMPARISON: October 2, 2018     TECHNIQUE: Axial CT imaging was obtained from the vertex of skull the  skull base. No IV contrast was administered.     FINDINGS:  No acute intracranial hemorrhage is seen. There is diffuse cerebral  atrophy, with compensatory ventricular dilatation, in keeping with the  age of 68. There is no midline shift or mass effect. The visualized  paranasal sinuses and mastoid air cells appear clear.        Impression:       No acute intracranial process identified.     Radiation dose reduction techniques were utilized, including automated  exposure control and exposure modulation based on body size.     This report was finalized on 1/11/2019 1:13 AM by Dr. Selam Kaur M.D.       XR Chest 2 View [[id number]] Collected:  01/10/19 2255     Updated:  01/10/19 2259    Narrative:       PA AND LATERAL CHEST RADIOGRAPH     HISTORY: Shortness of air     COMPARISON: None available.     FINDINGS:  Cardiomegaly is identified. There is no evidence of vascular congestion.  No pneumothorax or pleural effusion is seen. I do think the patient has  some mild bibasilar atelectasis.       Impression:       Mild bibasilar atelectasis. No acute infiltrates.     This report was finalized on 1/10/2019 10:56 PM by Dr. Selam Kaur M.D.         No results found for: SITE, ALLENTEST, PHART, CUZ3TEZ, PO2ART, CNS9UYV, BASEEXCESS, G4ZQXDJN, HGBBG, HCTABG, OXYHEMOGLOBI, METHHGBN, CARBOXYHGB, CO2CT, BAROMETRIC, MODALITY, FIO2       Discharge Medications      New Medications      Instructions Start Date   meclizine 25 MG tablet  Commonly known as:  ANTIVERT   25 mg, Oral, 3 Times Daily PRN      polyethylene glycol pack packet  Commonly known as:  MIRALAX   17 g, Oral, 2 Times Daily      predniSONE 20 MG tablet  Commonly known as:  DELTASONE   60 mg, Oral, Daily With Breakfast         Continue These Medications      Instructions Start Date   acetaminophen 650 MG 8 hr tablet  Commonly known as:  TYLENOL   650 mg, Oral, Every 8 Hours PRN      albuterol 108 (90 Base) MCG/ACT inhaler  Commonly known as:  PROAIR RESPICLICK   2 puffs, Inhalation, Every 4 Hours PRN      amLODIPine 10 MG tablet  Commonly known as:  NORVASC   10 mg, Oral, Daily      aspirin 81 MG EC tablet   81 mg, Oral, Daily      budesonide 0.5 MG/2ML nebulizer solution  Commonly known as:  PULMICORT   0.5 mg, Nebulization, 2 Times Daily      diazePAM 5 MG tablet  Commonly known  as:  VALIUM   5 mg, Oral, 2 Times Daily PRN      diclofenac 1 % gel gel  Commonly known as:  VOLTAREN   4 g, Topical, 4 Times Daily PRN      febuxostat 40 MG tablet  Commonly known as:  ULORIC   40 mg, Oral, Daily      ferrous sulfate 325 (65 FE) MG tablet   325 mg, Oral, 2 Times Daily      furosemide 40 MG tablet  Commonly known as:  LASIX   40 mg, Oral, 2 Times Daily PRN      gabapentin 600 MG tablet  Commonly known as:  NEURONTIN   600 mg, Oral, 3 Times Daily PRN      HYDROcodone-acetaminophen  MG per tablet  Commonly known as:  NORCO   1 tablet, Oral, Every 6 Hours PRN      ipratropium-albuterol 0.5-2.5 mg/3 ml nebulizer  Commonly known as:  DUO-NEB   3 mL, Nebulization, Every 4 Hours PRN      magnesium oxide 400 tablet tablet  Commonly known as:  MAG-OX   400 mg, Oral, 2 Times Daily PRN      MUCINEX ALLERGY PO   120 mg, Oral, 2 Times Daily PRN      MUCINEX DM PO   1,200 mg, Oral      nitroglycerin 0.4 MG/SPRAY spray  Commonly known as:  NITROLINGUAL   1 spray, Sublingual, Every 5 Minutes PRN      omeprazole 40 MG capsule  Commonly known as:  priLOSEC   40 mg, Oral, 2 Times Daily      polyethyl glycol-propyl glycol 0.4-0.3 % solution ophthalmic solution  Commonly known as:  SYSTANE   Nightly PRN      promethazine 25 MG tablet  Commonly known as:  PHENERGAN   25 mg, Oral, 2 Times Daily PRN      promethazine-codeine 6.25-10 MG/5ML syrup  Commonly known as:  PHENERGAN with CODEINE   5 mL, Oral, Every 4 Hours PRN      propranolol 10 MG tablet  Commonly known as:  INDERAL   5 mg, Oral, Daily PRN      rosuvastatin 20 MG tablet  Commonly known as:  CRESTOR   20 mg, Oral, Daily      valsartan 80 MG tablet  Commonly known as:  DIOVAN   320 mg, Oral, Daily      vitamin D3 5000 units capsule capsule   5,000 Units, Oral, Daily               Patient Instructions:       Future Appointments   Date Time Provider Department Center   1/25/2019  9:30 AM Bernardino Abbasi III, MD [facility] None         Contact information  for follow-up providers     Alessandra Willingham MD .    Specialty:  Family Medicine  Contact information:  [address]  [facility] [address] [phone]                   Contact information for after-discharge care     Home Medical Care     [facility] .    Service:  Home Health Services  Contact information:  [address], [address]  [facility] [address] [phone]                             Discharge Order (From admission, onward)    Start     Ordered    01/12/19 0851  Discharge patient  Once     Expected Discharge Date:  01/12/19    Discharge Disposition:  Home or Self Care    Physician of Record for Attribution - Please select from Treatment Team:  LEOBARDO GILL [[id number]]    Review needed by CMO to determine Physician of Record:  No       Question Answer Comment   Physician of Record for Attribution - Please select from Treatment Team LEOBARDO GILL    Review needed by CMO to determine Physician of Record No        01/12/19 0852            TEST  RESULTS PENDING AT DISCHARGE   Order Current Status    Blood Culture - Blood, Arm, Right Preliminary result    Blood Culture - Blood, Arm, Right Preliminary result            Discharge instructions:  Follow up with your primary care provider in 1-2 weeks with a cbc and cmp     Home health for vestibular rehab      Total time spent discharging patient including evaluation, post hospitalization follow up,  medication and post hospitalization instructions and education, total time exceeds 30 minutes.    Signed:  Leobardo Gill MD  1/13/2019  8:53 AM

## 2019-01-12 NOTE — PROGRESS NOTES
DOS: 2019  NAME: Zaira Menendez  : 1950  PCP: Alessandra Willingham MD  CC: vertigo    Subjective: Pt seen in follow up, however new to the examiner.  Vertigo improving, able to ambulate with minimal dizziness.     Objective:   Vital signs:  Vitals:    19 0500 19 0730 19 1049 19 1427   BP:  152/74  157/81   BP Location:  Left arm  Left arm   Patient Position:  Lying  Lying   Pulse:   79    Resp:  16 16 16   Temp:  98 °F (36.7 °C)  98.1 °F (36.7 °C)   TempSrc:  Oral  Oral   SpO2:   97%    Weight: 108 kg (237 lb 11.2 oz)      Height:           Current Facility-Administered Medications:   •  acetaminophen (TYLENOL) tablet 650 mg, 650 mg, Oral, Q4H PRN, Zion Kaufman MD  •  amLODIPine (NORVASC) tablet 10 mg, 10 mg, Oral, Daily, Leobardo Gill MD, 10 mg at 19 0928  •  aspirin EC tablet 81 mg, 81 mg, Oral, Daily, Zion Kaufman MD, 81 mg at 19 0928  •  budesonide (PULMICORT) nebulizer solution 0.5 mg, 0.5 mg, Nebulization, BID, Zion Kaufman MD, 0.5 mg at 19 1049  •  dextrose (D50W) 25 g/ 50mL Intravenous Solution 25 g, 25 g, Intravenous, Q15 Min PRN, Leobardo Gill MD  •  dextrose (GLUTOSE) oral gel 15 g, 15 g, Oral, Q15 Min PRN, Leobardo Gill MD  •  diazePAM (VALIUM) tablet 5 mg, 5 mg, Oral, Q6H PRN, Leobardo Gill MD  •  diclofenac (VOLTAREN) 1 % gel 4 g, 4 g, Topical, 4x Daily PRN, Leobardo Gill MD  •  enoxaparin (LOVENOX) syringe 40 mg, 40 mg, Subcutaneous, Q24H, Zion Kaufman MD, 40 mg at 19 0929  •  febuxostat (ULORIC) tablet 40 mg, 40 mg, Oral, Daily, Leobardo Gill MD, 40 mg at 19 0928  •  gabapentin (NEURONTIN) capsule 1,200 mg, 1,200 mg, Oral, Q8H, Leobardo Gill MD  •  glucagon (human recombinant) (GLUCAGEN DIAGNOSTIC) injection 1 mg, 1 mg, Subcutaneous, PRN, Leobardo Gill MD  •  HYDROcodone-acetaminophen (NORCO)  MG per tablet 1 tablet, 1 tablet, Oral, Q6H PRN, Leobardo Gill  MD MARCIAL, 1 tablet at 01/12/19 0401  •  ipratropium-albuterol (DUO-NEB) nebulizer solution 3 mL, 3 mL, Nebulization, Q4H PRN, Zion Kaufman MD  •  meclizine (ANTIVERT) tablet 25 mg, 25 mg, Oral, TID PRN, Zion Kaufman MD, 25 mg at 01/12/19 0401  •  ondansetron (ZOFRAN) tablet 4 mg, 4 mg, Oral, Q6H PRN **OR** ondansetron ODT (ZOFRAN-ODT) disintegrating tablet 4 mg, 4 mg, Oral, Q6H PRN **OR** ondansetron (ZOFRAN) injection 4 mg, 4 mg, Intravenous, Q6H PRN, Zion Kaufman MD  •  pantoprazole (PROTONIX) EC tablet 40 mg, 40 mg, Oral, QAM, Zion Kaufman MD, 40 mg at 01/12/19 0603  •  predniSONE (DELTASONE) tablet 60 mg, 60 mg, Oral, Daily With Breakfast, Leobardo Gill MD, 60 mg at 01/12/19 0927  •  rosuvastatin (CRESTOR) tablet 20 mg, 20 mg, Oral, Daily, Zion Kaufman MD, 20 mg at 01/12/19 0928  •  [COMPLETED] Insert peripheral IV, , , Once **AND** sodium chloride 0.9 % flush 10 mL, 10 mL, Intravenous, PRN, Jeison Puri MD  •  sodium chloride 0.9 % flush 3 mL, 3 mL, Intravenous, Q12H, Zion Kaufman MD, 3 mL at 01/11/19 2148  •  sodium chloride 0.9 % flush 3-10 mL, 3-10 mL, Intravenous, PRN, Zion Kaufman MD  •  valsartan (DIOVAN) tablet 320 mg, 320 mg, Oral, Daily, Leobardo Gill MD, 320 mg at 01/12/19 0928    PRN Meds  •  acetaminophen  •  dextrose  •  dextrose  •  diazePAM  •  diclofenac  •  glucagon (human recombinant)  •  HYDROcodone-acetaminophen  •  ipratropium-albuterol  •  meclizine  •  ondansetron **OR** ondansetron ODT **OR** ondansetron  •  [COMPLETED] Insert peripheral IV **AND** sodium chloride  •  sodium chloride    No current facility-administered medications on file prior to encounter.      Current Outpatient Medications on File Prior to Encounter   Medication Sig   • acetaminophen (TYLENOL) 650 MG 8 hr tablet Take 650 mg by mouth Every 8 (Eight) Hours As Needed for Mild Pain .   • albuterol (PROAIR RESPICLICK) 108 (90 Base) MCG/ACT inhaler Inhale 2 puffs  Every 4 (Four) Hours As Needed for Wheezing.   • amLODIPine (NORVASC) 10 MG tablet Take 10 mg by mouth Daily.   • aspirin 81 MG EC tablet Take 81 mg by mouth Daily.   • budesonide (PULMICORT) 0.5 MG/2ML nebulizer solution Take 0.5 mg by nebulization 2 (Two) Times a Day.   • Cholecalciferol (VITAMIN D3) 5000 units capsule capsule Take 5,000 Units by mouth Daily.   • Dextromethorphan-Guaifenesin (MUCINEX DM PO) Take 1,200 mg by mouth.   • diazePAM (VALIUM) 5 MG tablet Take 5 mg by mouth 2 (Two) Times a Day As Needed for Anxiety.   • diclofenac (VOLTAREN) 1 % gel gel Apply 4 g topically to the appropriate area as directed 4 (Four) Times a Day As Needed.   • febuxostat (ULORIC) 40 MG tablet Take 40 mg by mouth Daily.   • ferrous sulfate 325 (65 FE) MG tablet Take 325 mg by mouth 2 (Two) Times a Day.   • Fexofenadine HCl (MUCINEX ALLERGY PO) Take 120 mg by mouth 2 (Two) Times a Day As Needed.   • furosemide (LASIX) 40 MG tablet Take 40 mg by mouth 2 (Two) Times a Day As Needed.   • gabapentin (NEURONTIN) 600 MG tablet Take 600 mg by mouth 3 (Three) Times a Day As Needed.   • HYDROcodone-acetaminophen (NORCO)  MG per tablet Take 1 tablet by mouth Every 6 (Six) Hours As Needed for Moderate Pain .   • ipratropium-albuterol (DUO-NEB) 0.5-2.5 mg/3 ml nebulizer Take 3 mL by nebulization Every 4 (Four) Hours As Needed for Wheezing.   • magnesium oxide (MAG-OX) 400 tablet tablet Take 400 mg by mouth 2 (Two) Times a Day As Needed.   • nitroglycerin (NITROLINGUAL) 0.4 MG/SPRAY spray Place 1 spray under the tongue Every 5 (Five) Minutes As Needed for Chest Pain.   • omeprazole (priLOSEC) 40 MG capsule Take 40 mg by mouth 2 (Two) Times a Day.   • polyethyl glycol-propyl glycol (SYSTANE) 0.4-0.3 % solution ophthalmic solution At Night As Needed.   • promethazine (PHENERGAN) 25 MG tablet Take 25 mg by mouth 2 (Two) Times a Day As Needed for Nausea or Vomiting.   • promethazine-codeine (PHENERGAN with CODEINE) 6.25-10 MG/5ML  syrup Take 5 mL by mouth Every 4 (Four) Hours As Needed for Cough.   • propranolol (INDERAL) 10 MG tablet Take 5 mg by mouth Daily As Needed.   • rosuvastatin (CRESTOR) 20 MG tablet Take 20 mg by mouth Daily.   • valsartan (DIOVAN) 80 MG tablet Take 320 mg by mouth Daily.     Gen: NAD, vitals reviewed  MS: oriented x3, recent/remote memory intact, normal attention/concentration, language intact, no neglect, normal fund of knowledge  CN: visual acuity grossly normal, visual fields full, PERRL, EOMI, facial sensation equal, no facial droop, hearing symmetric, palate elevates symmetrically, shoulder shrug equal, tongue midline  Motor: 5/5 throughout upper and lower extremities, normal tone  Sensation: intact to light touch sensation  Reflexes: 2+ throughout upper and lower extremities, downgoing plantars  Coordination: no dysmetria with finger to nose bilaterally    Physical exam performed, changes noted.    Laboratory results:  No results found for: TSH  No results found for: HGBA1C  No results found for: WYXSBHZK38  Lab Results   Component Value Date    GLUCOSE 143 (H) 01/11/2019    BUN 9 01/11/2019    CREATININE 0.89 01/11/2019    EGFRIFAFRI 76 01/11/2019    BCR 10.1 01/11/2019    K 3.8 01/11/2019    CO2 27.8 01/11/2019    CALCIUM 9.8 01/11/2019    ALBUMIN 3.70 01/11/2019    AST 18 01/11/2019    ALT 16 01/11/2019     Lab Results   Component Value Date    WBC 4.87 01/11/2019    HGB 11.6 (L) 01/11/2019    HCT 37.2 01/11/2019    MCV 93.9 01/11/2019     01/11/2019     Review and interpretation of imaging:  Xr Chest 2 View    Result Date: 1/10/2019  Mild bibasilar atelectasis. No acute infiltrates.  This report was finalized on 1/10/2019 10:56 PM by Dr. Selam Kaur M.D.      Ct Head Without Contrast    Result Date: 1/11/2019  No acute intracranial process identified.  Radiation dose reduction techniques were utilized, including automated exposure control and exposure modulation based on body size.  This  report was finalized on 1/11/2019 1:13 AM by Dr. Selam Kaur M.D.      Impression/Assessment:  This is a 67 yo female with PMH of CHF, COPD, CAD, HTN, HLD who was admitted with c/o gradually worsening vertigo x 2 weeks. She also states 2 weeks ago she had a flu like illness where she was achy all over and had some nasal congestion. She has no history of vertigo.  She reportedly had a rocking back and forth sensation and yesterday had some N/V associated with it.  CT head was negative for any acute intracranial process.    1. Vestibular neuritis, left ear  2. COPD  3. Essential hypertension    We started patient on Prednisone 60mg yesterday. She reports her vertigo has greatly improved. She has gotten up and ambulated and reports very little dizziness. She does report a slight headache in the front of her head. Not tender to palpation. I do not see a need to order an MRI. Vertigo has already improved and she has no other neurological symptoms. Patient not on diabetic medication at home but reports her BS can run high. Recommend close monitoring of BS while taking Pred. We will sign off, will see again per request. Please call with any questions.    PLAN:  Continue Prednisone 60mg x5 days, no taper.  Monitor BS closely, management per primary  Vestibular rehab after discharge.  Will sign off, will see again per request.     Plans discussed with patient, RN, Dr. Cantu, agrees with plan above.  MARTI Shultz

## 2019-01-13 VITALS
HEART RATE: 84 BPM | RESPIRATION RATE: 16 BRPM | HEIGHT: 60 IN | OXYGEN SATURATION: 97 % | TEMPERATURE: 98 F | BODY MASS INDEX: 46.4 KG/M2 | DIASTOLIC BLOOD PRESSURE: 66 MMHG | SYSTOLIC BLOOD PRESSURE: 137 MMHG | WEIGHT: 236.33 LBS

## 2019-01-13 PROBLEM — K59.00 CONSTIPATION: Status: ACTIVE | Noted: 2019-01-13

## 2019-01-13 PROBLEM — E11.9 DM2 (DIABETES MELLITUS, TYPE 2) (HCC): Status: ACTIVE | Noted: 2019-01-13

## 2019-01-13 LAB — GLUCOSE BLDC GLUCOMTR-MCNC: 187 MG/DL (ref 70–130)

## 2019-01-13 PROCEDURE — 82962 GLUCOSE BLOOD TEST: CPT

## 2019-01-13 PROCEDURE — G0378 HOSPITAL OBSERVATION PER HR: HCPCS

## 2019-01-13 PROCEDURE — 63710000001 PREDNISONE PER 5 MG: Performed by: HOSPITALIST

## 2019-01-13 PROCEDURE — 94799 UNLISTED PULMONARY SVC/PX: CPT

## 2019-01-13 RX ORDER — PREDNISONE 20 MG/1
60 TABLET ORAL
Qty: 6 TABLET | Refills: 0
Start: 2019-01-13 | End: 2019-01-15

## 2019-01-13 RX ORDER — MECLIZINE HYDROCHLORIDE 25 MG/1
25 TABLET ORAL 3 TIMES DAILY PRN
Qty: 20 TABLET | Refills: 0 | Status: SHIPPED | OUTPATIENT
Start: 2019-01-13

## 2019-01-13 RX ADMIN — ROSUVASTATIN CALCIUM 20 MG: 20 TABLET, FILM COATED ORAL at 09:21

## 2019-01-13 RX ADMIN — BACITRACIN ZINC AND POLYMYXIN B SULFATE: 500; 10000 OINTMENT OPHTHALMIC at 09:25

## 2019-01-13 RX ADMIN — HYDROCODONE BITARTRATE AND ACETAMINOPHEN 1 TABLET: 10; 325 TABLET ORAL at 04:27

## 2019-01-13 RX ADMIN — AMLODIPINE BESYLATE 10 MG: 10 TABLET ORAL at 09:21

## 2019-01-13 RX ADMIN — ASPIRIN 81 MG: 81 TABLET, DELAYED RELEASE ORAL at 09:21

## 2019-01-13 RX ADMIN — GUAIFENESIN 1200 MG: 600 TABLET, EXTENDED RELEASE ORAL at 09:21

## 2019-01-13 RX ADMIN — FEBUXOSTAT 40 MG: 40 TABLET ORAL at 09:21

## 2019-01-13 RX ADMIN — VALSARTAN 320 MG: 320 TABLET ORAL at 09:21

## 2019-01-13 RX ADMIN — GABAPENTIN 1200 MG: 400 CAPSULE ORAL at 06:54

## 2019-01-13 RX ADMIN — PREDNISONE 60 MG: 50 TABLET ORAL at 09:22

## 2019-01-13 RX ADMIN — Medication 400 MG: at 09:21

## 2019-01-13 RX ADMIN — BUDESONIDE 0.5 MG: 0.5 INHALANT RESPIRATORY (INHALATION) at 08:54

## 2019-01-13 RX ADMIN — PANTOPRAZOLE SODIUM 40 MG: 40 TABLET, DELAYED RELEASE ORAL at 06:54

## 2019-01-13 NOTE — NURSING NOTE
Hold Lovenox 40 mg due to approaching discharge and ferrous sulfate 325 mg due to constipation per Dr. Gill today at 0916.

## 2019-01-13 NOTE — PROGRESS NOTES
Continued Stay Note  [facility]     Patient Name: Zaira Menendez  MRN: [id number]  Today's Date: 1/13/2019    Admit Date: 1/10/2019    Discharge Plan     Row Name 01/13/19 1656       Plan    Plan Comments  Call back from [facility] stating their Physical Therapists are able to do Vestibular Rehab in the home. Patient updated and this is what she wishes to do. [facility] GORE will follow at home.........Michele MARTIN         Discharge Codes    No documentation.       Expected Discharge Date and Time     Expected Discharge Date Expected Discharge Time    Jan 12, 2019             Lupe Sterling, VERONICA

## 2019-01-13 NOTE — PROGRESS NOTES
Continued Stay Note  [facility]     Patient Name: Zaira Menendez  MRN: [id number]  Today's Date: 1/13/2019    Admit Date: 1/10/2019    Discharge Plan     Row Name 01/13/19 0702       Plan    Plan  Home with [facility]. Vestibular Rehab (With [facility] if they offer this, Outpatient at [facility] if they do not). Pt. uses [facility] and stated transportation would not be an issue with advanced notice..............Michele MARTIN     Patient/Family in Agreement with Plan  yes    Plan Comments  1/12/19 6pm: RN states patient will not DC this evening due to need to straighten out some issues with medications and other orders. S/W Patient, introduced self and role. Pt. states she wishes to have [facility] follow as they have always been her HH agency in past. If [facility] is able to do Vestibular Rehab patient is in agreement with this. If [facility] is not able to do Vestibular Rehab, states she is ok with coming in to [facility] outpatient speech therapy for this therapy. Pt. verbalized understanding that if this is the route we go someone from the rehab department will follow-up with her Monday at home. Best contact number [phone]. Pt. recently moved to [facility] and address on facesheet is correct: [address] [place] [address] [address] [place], [place] [id number]. [facility] will follow..............Michele MARTIN         Discharge Codes    No documentation.       Expected Discharge Date and Time     Expected Discharge Date Expected Discharge Time    Jan 12, 2019             Lupe Sterling RN

## 2019-01-13 NOTE — PROGRESS NOTES
[facility]     LOS: 0 days     Subjective:    CC:Nausea; Dizziness; Cough; Fever; and Weakness - Generalized    DIET:  Diet Order   Procedures   • Diet Regular     Patient is feeling better    Objective:    Vital Signs:  Af vss      on   ;         Physical Exam   Constitutional: She appears well-developed and well-nourished.   HENT:   Head: Normocephalic and atraumatic.   Cardiovascular: Exam reveals no friction rub.   No murmur heard.  Pulmonary/Chest: Effort normal and breath sounds normal.   Abdominal: Soft. Bowel sounds are normal. She exhibits no distension. There is no tenderness.   Neurological: She is alert.   Skin: Skin is warm and dry.       Results Review:                    Cultures:  Blood Culture   Date Value Ref Range Status   01/10/2019 No growth at 2 days  Preliminary   01/10/2019 No growth at 2 days  Preliminary       I have reviewed daily medications and changes in CPOE    Scheduled meds    amLODIPine 10 mg Oral Daily   aspirin 81 mg Oral Daily   bacitracin-polymyxin b  Topical BID   budesonide 0.5 mg Nebulization BID   cholecalciferol 5,000 Units Oral Daily   enoxaparin 40 mg Subcutaneous Q24H   febuxostat 40 mg Oral Daily   ferrous sulfate 325 mg Oral BID With Meals   furosemide 40 mg Oral Daily   gabapentin 1,200 mg Oral Q8H   guaiFENesin 1,200 mg Oral Q12H   magnesium oxide 400 mg Oral BID   pantoprazole 40 mg Oral QAM   pantoprazole 40 mg Oral BID AC   polyethylene glycol 17 g Oral Daily   predniSONE 60 mg Oral Daily With Breakfast   rosuvastatin 20 mg Oral Daily   sodium chloride 3 mL Intravenous Q12H   valsartan 320 mg Oral Daily          PRN meds  •  acetaminophen  •  dextrose  •  dextrose  •  diazePAM  •  diclofenac  •  glucagon (human recombinant)  •  HYDROcodone-acetaminophen  •  ipratropium-albuterol  •  meclizine  •  ondansetron **OR** ondansetron ODT **OR** ondansetron  •  promethazine  •  [COMPLETED] Insert peripheral IV **AND** sodium chloride  •   sodium chloride        Vertigo    DM2 (diabetes mellitus, type 2) (CMS/HCC)    Constipation        Assessment/Plan:    Vertigo  -better  -prednisone total of 5 days      DM2 (diabetes mellitus, type 2) (CMS/HCC)  -ssi  -blood sugars are stable on the high dose steroids      Constipation-bm yesterday  -miralax  -hold iron couple days until having better BMs    Patient kept in hospital for persistent abd pain    >35 minutes spent, > 1/2 time spent counseling and coordination of care vertigo    Leobardo Gill MD  01/13/19  9:08 AM

## 2019-01-13 NOTE — PLAN OF CARE
Problem: Patient Care Overview  Goal: Plan of Care Review  Outcome: Ongoing (interventions implemented as appropriate)   01/13/19 9824   Plan of Care Review   Progress improving   OTHER   Outcome Summary Pt appeared to sleep fair, pain in knees managed with oral meds, pt denies dizziness, up with assit to br, ivf s/l'd, possible d/c today to home with home health.        Problem: Fall Risk (Adult)  Goal: Absence of Fall  Outcome: Ongoing (interventions implemented as appropriate)      Problem: Pain, Acute (Adult)  Goal: Acceptable Pain Control/Comfort Level  Outcome: Ongoing (interventions implemented as appropriate)

## 2019-01-14 NOTE — PROGRESS NOTES
Case Management Discharge Note    Final Note: Pt dc'd home with [facility]    Destination      No service has been selected for the patient.      Durable Medical Equipment      No service has been selected for the patient.      Dialysis/Infusion      No service has been selected for the patient.      Home Medical Care - Selection Complete      Service Provider Request Status Selected Services Address Phone Number Fax Number    [facility] [id number] [facility] [facility], [address], [facility] [address] [phone]       Lisandra Lewis RN 1/11/2019 1351    Referral to [facility]      No service has been selected for the patient.        Other: Other    Final Discharge Disposition Code: 06 - home with home health care

## 2019-01-15 LAB
BACTERIA SPEC AEROBE CULT: NORMAL
BACTERIA SPEC AEROBE CULT: NORMAL

## 2020-08-26 ENCOUNTER — TRANSCRIBE ORDERS (OUTPATIENT)
Dept: ADMINISTRATIVE | Facility: HOSPITAL | Age: 70
End: 2020-08-26

## 2020-08-26 DIAGNOSIS — R53.1 LEFT-SIDED WEAKNESS: ICD-10-CM

## 2020-08-26 DIAGNOSIS — R29.898 BILATERAL ARM WEAKNESS: ICD-10-CM

## 2020-08-26 DIAGNOSIS — R29.90 STROKE-LIKE SYMPTOMS: ICD-10-CM

## 2020-08-26 DIAGNOSIS — R53.1 LEFT-SIDED WEAKNESS: Primary | ICD-10-CM

## 2020-08-26 DIAGNOSIS — R29.90 STROKE-LIKE SYMPTOMS: Primary | ICD-10-CM

## 2020-09-17 ENCOUNTER — HOSPITAL ENCOUNTER (OUTPATIENT)
Dept: CARDIOLOGY | Facility: HOSPITAL | Age: 70
Discharge: HOME | End: 2020-09-17

## 2020-09-17 ENCOUNTER — HOSPITAL ENCOUNTER (OUTPATIENT)
Dept: MRI IMAGING | Facility: HOSPITAL | Age: 70
Discharge: HOME | End: 2020-09-17

## 2020-09-17 DIAGNOSIS — R53.1 LEFT-SIDED WEAKNESS: ICD-10-CM

## 2020-09-17 DIAGNOSIS — R29.898 BILATERAL ARM WEAKNESS: ICD-10-CM

## 2020-09-17 DIAGNOSIS — R29.90 STROKE-LIKE SYMPTOMS: ICD-10-CM

## 2020-09-17 LAB
BH CV XLRA MEAS LEFT DIST CCA EDV: 14 CM/SEC
BH CV XLRA MEAS LEFT DIST CCA PSV: 70.8 CM/SEC
BH CV XLRA MEAS LEFT DIST ICA EDV: -19 CM/SEC
BH CV XLRA MEAS LEFT DIST ICA PSV: -66.1 CM/SEC
BH CV XLRA MEAS LEFT ICA/CCA RATIO: 1.24
BH CV XLRA MEAS LEFT MID ICA EDV: -25.9 CM/SEC
BH CV XLRA MEAS LEFT MID ICA PSV: -87.8 CM/SEC
BH CV XLRA MEAS LEFT PROX CCA EDV: 15.4 CM/SEC
BH CV XLRA MEAS LEFT PROX CCA PSV: 103.7 CM/SEC
BH CV XLRA MEAS LEFT PROX ECA EDV: -8.4 CM/SEC
BH CV XLRA MEAS LEFT PROX ECA PSV: -71.5 CM/SEC
BH CV XLRA MEAS LEFT PROX ICA EDV: -14.7 CM/SEC
BH CV XLRA MEAS LEFT PROX ICA PSV: -64.5 CM/SEC
BH CV XLRA MEAS LEFT PROX SCLA PSV: 183.3 CM/SEC
BH CV XLRA MEAS LEFT VERTEBRAL A EDV: 9.8 CM/SEC
BH CV XLRA MEAS LEFT VERTEBRAL A PSV: 57.4 CM/SEC
BH CV XLRA MEAS RIGHT DIST CCA EDV: 17.4 CM/SEC
BH CV XLRA MEAS RIGHT DIST CCA PSV: 65.9 CM/SEC
BH CV XLRA MEAS RIGHT DIST ICA EDV: 17.3 CM/SEC
BH CV XLRA MEAS RIGHT DIST ICA PSV: 51.7 CM/SEC
BH CV XLRA MEAS RIGHT ICA/CCA RATIO: 0.88
BH CV XLRA MEAS RIGHT MID ICA EDV: 20.4 CM/SEC
BH CV XLRA MEAS RIGHT MID ICA PSV: 54.6 CM/SEC
BH CV XLRA MEAS RIGHT PROX CCA EDV: 16.2 CM/SEC
BH CV XLRA MEAS RIGHT PROX CCA PSV: 70.2 CM/SEC
BH CV XLRA MEAS RIGHT PROX ECA PSV: -162.4 CM/SEC
BH CV XLRA MEAS RIGHT PROX ICA EDV: -18.8 CM/SEC
BH CV XLRA MEAS RIGHT PROX ICA PSV: -58 CM/SEC
BH CV XLRA MEAS RIGHT PROX SCLA PSV: 112 CM/SEC
BH CV XLRA MEAS RIGHT VERTEBRAL A EDV: -19.3 CM/SEC
BH CV XLRA MEAS RIGHT VERTEBRAL A PSV: -50.9 CM/SEC
RIGHT ARM BP: NORMAL MMHG

## 2020-09-17 PROCEDURE — 93880 EXTRACRANIAL BILAT STUDY: CPT

## 2020-09-17 PROCEDURE — 72141 MRI NECK SPINE W/O DYE: CPT

## 2020-09-17 PROCEDURE — 70551 MRI BRAIN STEM W/O DYE: CPT

## 2021-02-25 ENCOUNTER — TELEPHONE (OUTPATIENT)
Dept: CARDIOLOGY | Facility: CLINIC | Age: 71
End: 2021-02-25

## 2021-02-25 NOTE — TELEPHONE ENCOUNTER
DIXIE: Pt called to inform you that she had a echo at [facility] on 2/23/21.    She has an appointment to see you on 3/2/21.    PT #: [phone]

## 2021-02-25 NOTE — TELEPHONE ENCOUNTER
Please make sure that that echocardiogram is printed out for Dr. Abbasi for that visit and he can address during that time.

## 2021-03-02 ENCOUNTER — OFFICE VISIT (OUTPATIENT)
Dept: CARDIOLOGY | Facility: CLINIC | Age: 71
End: 2021-03-02

## 2021-03-02 VITALS
HEART RATE: 86 BPM | SYSTOLIC BLOOD PRESSURE: 128 MMHG | HEIGHT: 60 IN | WEIGHT: 248 LBS | BODY MASS INDEX: 48.69 KG/M2 | DIASTOLIC BLOOD PRESSURE: 70 MMHG

## 2021-03-02 DIAGNOSIS — N18.31 STAGE 3A CHRONIC KIDNEY DISEASE (HCC): ICD-10-CM

## 2021-03-02 DIAGNOSIS — I10 BENIGN ESSENTIAL HYPERTENSION: Primary | ICD-10-CM

## 2021-03-02 DIAGNOSIS — E11.618 TYPE 2 DIABETES MELLITUS WITH OTHER DIABETIC ARTHROPATHY, WITHOUT LONG-TERM CURRENT USE OF INSULIN (HCC): ICD-10-CM

## 2021-03-02 DIAGNOSIS — I50.23 ACUTE ON CHRONIC SYSTOLIC CONGESTIVE HEART FAILURE (HCC): ICD-10-CM

## 2021-03-02 DIAGNOSIS — I25.10 CORONARY ARTERIOSCLEROSIS: ICD-10-CM

## 2021-03-02 DIAGNOSIS — I25.119 CORONARY ARTERY DISEASE INVOLVING NATIVE CORONARY ARTERY OF NATIVE HEART WITH ANGINA PECTORIS (HCC): Chronic | ICD-10-CM

## 2021-03-02 DIAGNOSIS — Z23 IMMUNIZATION DUE: ICD-10-CM

## 2021-03-02 DIAGNOSIS — J43.9 PULMONARY EMPHYSEMA, UNSPECIFIED EMPHYSEMA TYPE (HCC): ICD-10-CM

## 2021-03-02 DIAGNOSIS — R06.02 SOB (SHORTNESS OF BREATH): ICD-10-CM

## 2021-03-02 DIAGNOSIS — Z95.5 PRESENCE OF DRUG COATED STENT IN LAD CORONARY ARTERY: ICD-10-CM

## 2021-03-02 PROCEDURE — 93000 ELECTROCARDIOGRAM COMPLETE: CPT | Performed by: INTERNAL MEDICINE

## 2021-03-02 PROCEDURE — 99204 OFFICE O/P NEW MOD 45 MIN: CPT | Performed by: INTERNAL MEDICINE

## 2021-03-02 NOTE — PROGRESS NOTES
Subjective:     Encounter Date:03/02/21      Patient ID: Zaira Menendez is a 70 y.o. female.    Chief Complaint: CAD  History of Present Illness    Dear Dr. Willingham,    I had the pleasure of seeing this patient in the office today for initial evaluation and consultation.  I appreciate that you sent her in to see us.  They come in today to be seen for follow-up of her cardiac status.    Patient is a pleasant female with a history of CAD.  She is previously followed with Dr. Akshat Stevens.  She has a history of a prior stent placed in her LAD; this was performed at [facility] in 2004.  She has stress Cardiolite performed in 2015 that showed no ischemia.    April 20, 2004 she had an ST segment elevation myocardial infarction of the anterior wall.  Cardiac catheterization at that time showed normal left main, 75% mid LAD, 20% circumflex, 20% RCA, PDA with a 30 and then 50% stenosis.  She then had a drug-eluting stent placed in the mid LAD.  In 2010 she had a cardiac catheterization that showed normal left main, mid LAD stent was widely patent, distal stent had a 20% in-stent stenosis, circumflex 20% stenosis, RCA 20% stenosis, PDA was 30%, ejection fraction was 60%.    Lately patient's been having worsening shortness of breath.  She just had an echocardiogram performed at [facility] and this showed no significant abnormality.  Left her size and function was normal.  Valvular structure and function was normal she had a mild tricuspid regurgitation.    She denies any significant activity.  She walks with a walker for stability she does get short of breath when she is walking with her walker.  She has been having problem with lower extremity edema.  No orthopnea or PND.  She gets some chest fullness and heaviness at times but no pain per se.  The fullness does not radiate.  She has been getting more fatigued.  She has occasional palpitations but no sustained tachycardia.      The following portions of the  "patient's history were reviewed and updated as appropriate: allergies, current medications, past family history, past medical history, past social history, past surgical history and problem list.      ECG 12 Lead    Date/Time: 3/2/2021 12:39 PM  Performed by: Bernardino Abbasi III, MD  Authorized by: Bernardino Abbasi III, MD   Comparison: compared with previous ECG   Similar to previous ECG  Rhythm: sinus rhythm  Rate: normal  Conduction: conduction normal  QRS axis: normal  Other findings: non-specific ST-T wave changes    Clinical impression: non-specific ECG               Objective:     Vitals:    03/02/21 0915   BP: 128/70   Pulse: 86   Weight: 112 kg (248 lb)   Height: 152.4 cm (60\")         Vitals signs reviewed.   Constitutional:       General: Not in acute distress.     Appearance: Well-developed. Not diaphoretic.   Eyes:      General:         Right eye: No discharge.         Left eye: No discharge.      Conjunctiva/sclera: Conjunctivae normal.      Pupils: Pupils are equal, round, and reactive to light.   HENT:      Head: Normocephalic and atraumatic.      Nose: Nose normal.   Neck:      Musculoskeletal: Normal range of motion and neck supple.      Thyroid: No thyromegaly.      Trachea: No tracheal deviation.      Lymphadenopathy: No cervical adenopathy.   Pulmonary:      Effort: Pulmonary effort is normal. No respiratory distress.      Breath sounds: Normal breath sounds. No stridor.   Chest:      Chest wall: Not tender to palpatation.   Cardiovascular:      Normal rate. Regular rhythm.      Murmurs: There is no murmur.      . No S3 gallop. No click. No rub.   Pulses:     Intact distal pulses.   Abdominal:      General: Bowel sounds are normal. There is no distension.      Palpations: Abdomen is soft. There is no abdominal mass.      Tenderness: There is no abdominal tenderness. There is no guarding or rebound.   Musculoskeletal: Normal range of motion.         General: No tenderness or deformity.   Skin:     " General: Skin is warm and dry.      Findings: No erythema or rash.   Neurological:      Mental Status: Alert and oriented to person, place, and time.      Deep Tendon Reflexes: Reflexes are normal and symmetric.   Psychiatric:         Thought Content: Thought content normal.         Data and records reviewed:     Lab Results   Component Value Date    GLUCOSE 143 (H) 01/11/2019    BUN 21 (H) 10/01/2020    CREATININE 0.80 10/01/2020    EGFRIFAFRI 76 01/11/2019    BCR 26.3 10/01/2020    K 3.9 10/01/2020    CO2 29 10/01/2020    CALCIUM 9.6 10/01/2020    ALBUMIN 3.70 01/11/2019    AST 18 01/11/2019    ALT 16 01/11/2019     No results found for: CHOL  Lab Results   Component Value Date    TRIG 79 01/16/2020    TRIG 106 01/16/2014     Lab Results   Component Value Date    HDL 54 01/16/2020    HDL 59 01/16/2014     Lab Results   Component Value Date    LDL 63 01/16/2020    LDL 45 01/16/2014     Lab Results   Component Value Date    VLDL 16 01/16/2020     No results found for: LDLHDL    No radiology results for the last 90 days.  Results for orders placed in visit on 05/11/15   SCANNED - ECHOCARDIOGRAM           Assessment:          Diagnosis Plan   1. Benign essential hypertension  Stress Test With Pet Myocardial Perfusion (SINGLE STUDY, REST OR STRESS ONLY)    ECG 12 Lead   2. Pulmonary emphysema, unspecified emphysema type (CMS/HCC)  Stress Test With Pet Myocardial Perfusion (SINGLE STUDY, REST OR STRESS ONLY)    ECG 12 Lead   3. Acute on chronic systolic congestive heart failure (CMS/HCC)  Stress Test With Pet Myocardial Perfusion (SINGLE STUDY, REST OR STRESS ONLY)    ECG 12 Lead   4. Type 2 diabetes mellitus with other diabetic arthropathy, without long-term current use of insulin (CMS/[facility])  Stress Test With Pet Myocardial Perfusion (SINGLE STUDY, REST OR STRESS ONLY)    ECG 12 Lead   5. Body mass index (BMI) of 45.0-49.9 in adult (CMS/HCC)  Stress Test With Pet Myocardial Perfusion (SINGLE STUDY, REST OR STRESS ONLY)     ECG 12 Lead   6. Stage 3a chronic kidney disease (CMS/HCC)  Stress Test With Pet Myocardial Perfusion (SINGLE STUDY, REST OR STRESS ONLY)    ECG 12 Lead   7. Coronary arteriosclerosis  Stress Test With Pet Myocardial Perfusion (SINGLE STUDY, REST OR STRESS ONLY)    ECG 12 Lead   8. Presence of drug coated stent in LAD coronary artery  Stress Test With Pet Myocardial Perfusion (SINGLE STUDY, REST OR STRESS ONLY)    ECG 12 Lead   9. Coronary artery disease involving native coronary artery of native heart with angina pectoris (CMS/HCC)  Stress Test With Pet Myocardial Perfusion (SINGLE STUDY, REST OR STRESS ONLY)    ECG 12 Lead   10. SOB (shortness of breath)  Stress Test With Pet Myocardial Perfusion (SINGLE STUDY, REST OR STRESS ONLY)    ECG 12 Lead          Plan:       1.  Exertional dyspnea at low levels of activity as well as some chest discomfort-unable to ambulate on a treadmill.  Morbidly obese, we will arrange a Lexiscan PET study to be performed  2.  CAD with symptoms concerning for angina pectoris, I reviewed her most recent stress test as well as most recent echocardiogram and cardiac catheterization.  For now continue current guideline directed medical therapy, we will await the results on the stress test  3.  Stent to her LAD in the setting of an ST segment elevation MI in 2004  4.  Morbid obesity, complicating all aspects of care  5.  Chronic renal failure, creatinine reviewed  6.  Diabetes mellitus with circulatory complication, risk factor modification  Thank you very much for allowing us to participate in the care of this pleasant patient.  Please don't hesitate to call if I can be of assistance in any way.      Current Outpatient Medications:   •  acetaminophen (TYLENOL) 650 MG 8 hr tablet, Take 650 mg by mouth Every 8 (Eight) Hours As Needed for Mild Pain ., Disp: , Rfl:   •  albuterol (PROAIR RESPICLICK) 108 (90 Base) MCG/ACT inhaler, Inhale 2 puffs Every 4 (Four) Hours As Needed for Wheezing.,  Disp: , Rfl:   •  amLODIPine (NORVASC) 10 MG tablet, Take 10 mg by mouth Daily., Disp: , Rfl:   •  aspirin 81 MG EC tablet, Take 81 mg by mouth Daily., Disp: , Rfl:   •  budesonide (PULMICORT) 0.5 MG/2ML nebulizer solution, Take 0.5 mg by nebulization 2 (Two) Times a Day., Disp: , Rfl:   •  Cholecalciferol (VITAMIN D3) 5000 units capsule capsule, Take 5,000 Units by mouth Daily., Disp: , Rfl:   •  diazePAM (VALIUM) 5 MG tablet, Take 5 mg by mouth 2 (Two) Times a Day As Needed for Anxiety., Disp: , Rfl:   •  diclofenac (VOLTAREN) 1 % gel gel, Apply 4 g topically to the appropriate area as directed 4 (Four) Times a Day As Needed., Disp: , Rfl:   •  febuxostat (ULORIC) 40 MG tablet, Take 40 mg by mouth Daily., Disp: , Rfl:   •  ferrous sulfate 325 (65 FE) MG tablet, Take 325 mg by mouth 2 (Two) Times a Day., Disp: , Rfl:   •  furosemide (LASIX) 40 MG tablet, Take 40 mg by mouth 2 (Two) Times a Day., Disp: , Rfl:   •  gabapentin (NEURONTIN) 600 MG tablet, Take 600 mg by mouth 2 (two) times a day., Disp: , Rfl:   •  HYDROcodone-acetaminophen (NORCO)  MG per tablet, Take 1 tablet by mouth Every 6 (Six) Hours As Needed for Moderate Pain ., Disp: , Rfl:   •  ipratropium-albuterol (DUO-NEB) 0.5-2.5 mg/3 ml nebulizer, Take 3 mL by nebulization Every 4 (Four) Hours As Needed for Wheezing., Disp: , Rfl:   •  meclizine (ANTIVERT) 25 MG tablet, Take 1 tablet by mouth 3 (Three) Times a Day As Needed for dizziness or Nausea., Disp: 20 tablet, Rfl: 0  •  nitroglycerin (NITROLINGUAL) 0.4 MG/SPRAY spray, Place 1 spray under the tongue Every 5 (Five) Minutes As Needed for Chest Pain., Disp: , Rfl:   •  omeprazole (priLOSEC) 40 MG capsule, Take 40 mg by mouth 2 (Two) Times a Day., Disp: , Rfl:   •  polyethyl glycol-propyl glycol (SYSTANE) 0.4-0.3 % solution ophthalmic solution, At Night As Needed., Disp: , Rfl:   •  promethazine (PHENERGAN) 25 MG tablet, Take 25 mg by mouth 2 (Two) Times a Day As Needed for Nausea or Vomiting.,  Disp: , Rfl:   •  propranolol (INDERAL) 10 MG tablet, Take 10 mg by mouth Daily As Needed., Disp: , Rfl:   •  rosuvastatin (CRESTOR) 20 MG tablet, Take 20 mg by mouth Daily., Disp: , Rfl:   •  valsartan (DIOVAN) 320 MG tablet, Take 320 mg by mouth Daily., Disp: , Rfl:          No follow-ups on file.

## 2021-03-03 ENCOUNTER — TRANSCRIBE ORDERS (OUTPATIENT)
Dept: SLEEP MEDICINE | Facility: HOSPITAL | Age: 71
End: 2021-03-03

## 2021-03-03 DIAGNOSIS — Z01.818 OTHER SPECIFIED PRE-OPERATIVE EXAMINATION: Primary | ICD-10-CM

## 2021-03-19 ENCOUNTER — LAB (OUTPATIENT)
Dept: LAB | Facility: HOSPITAL | Age: 71
End: 2021-03-19

## 2021-03-19 DIAGNOSIS — Z01.818 OTHER SPECIFIED PRE-OPERATIVE EXAMINATION: ICD-10-CM

## 2021-03-19 PROCEDURE — C9803 HOPD COVID-19 SPEC COLLECT: HCPCS

## 2021-03-19 PROCEDURE — U0005 INFEC AGEN DETEC AMPLI PROBE: HCPCS

## 2021-03-19 PROCEDURE — U0004 COV-19 TEST NON-CDC HGH THRU: HCPCS

## 2021-03-20 LAB — SARS-COV-2 ORF1AB RESP QL NAA+PROBE: NOT DETECTED

## 2021-03-22 ENCOUNTER — HOSPITAL ENCOUNTER (EMERGENCY)
Facility: HOSPITAL | Age: 71
Discharge: HOME | End: 2021-03-22
Attending: EMERGENCY MEDICINE | Admitting: EMERGENCY MEDICINE

## 2021-03-22 ENCOUNTER — HOSPITAL ENCOUNTER (OUTPATIENT)
Dept: CARDIOLOGY | Facility: HOSPITAL | Age: 71
Discharge: HOME | End: 2021-03-22
Admitting: INTERNAL MEDICINE

## 2021-03-22 ENCOUNTER — APPOINTMENT (OUTPATIENT)
Dept: GENERAL RADIOLOGY | Facility: HOSPITAL | Age: 71
End: 2021-03-22

## 2021-03-22 VITALS
SYSTOLIC BLOOD PRESSURE: 171 MMHG | DIASTOLIC BLOOD PRESSURE: 91 MMHG | RESPIRATION RATE: 16 BRPM | TEMPERATURE: 96.5 F | HEART RATE: 91 BPM | OXYGEN SATURATION: 97 %

## 2021-03-22 DIAGNOSIS — I25.119 CORONARY ARTERY DISEASE INVOLVING NATIVE CORONARY ARTERY OF NATIVE HEART WITH ANGINA PECTORIS (HCC): ICD-10-CM

## 2021-03-22 DIAGNOSIS — R06.02 SOB (SHORTNESS OF BREATH): ICD-10-CM

## 2021-03-22 DIAGNOSIS — Z95.5 PRESENCE OF DRUG COATED STENT IN LAD CORONARY ARTERY: ICD-10-CM

## 2021-03-22 DIAGNOSIS — I10 BENIGN ESSENTIAL HYPERTENSION: ICD-10-CM

## 2021-03-22 DIAGNOSIS — J43.9 PULMONARY EMPHYSEMA, UNSPECIFIED EMPHYSEMA TYPE (HCC): ICD-10-CM

## 2021-03-22 DIAGNOSIS — N18.31 STAGE 3A CHRONIC KIDNEY DISEASE (HCC): ICD-10-CM

## 2021-03-22 DIAGNOSIS — M54.12 LEFT CERVICAL RADICULOPATHY: Primary | ICD-10-CM

## 2021-03-22 DIAGNOSIS — E11.618 TYPE 2 DIABETES MELLITUS WITH OTHER DIABETIC ARTHROPATHY, WITHOUT LONG-TERM CURRENT USE OF INSULIN (HCC): ICD-10-CM

## 2021-03-22 DIAGNOSIS — I25.10 CORONARY ARTERIOSCLEROSIS: ICD-10-CM

## 2021-03-22 DIAGNOSIS — I50.23 ACUTE ON CHRONIC SYSTOLIC CONGESTIVE HEART FAILURE (HCC): ICD-10-CM

## 2021-03-22 DIAGNOSIS — M54.16 RIGHT LUMBAR RADICULOPATHY: ICD-10-CM

## 2021-03-22 LAB
ALBUMIN SERPL-MCNC: 4.2 G/DL (ref 3.5–5.2)
ALBUMIN/GLOB SERPL: 1.2 G/DL
ALP SERPL-CCNC: 101 U/L (ref 39–117)
ALT SERPL W P-5'-P-CCNC: 10 U/L (ref 1–33)
ANION GAP SERPL CALCULATED.3IONS-SCNC: 10.2 MMOL/L (ref 5–15)
AST SERPL-CCNC: 12 U/L (ref 1–32)
BASOPHILS # BLD AUTO: 0.06 10*3/MM3 (ref 0–0.2)
BASOPHILS NFR BLD AUTO: 1.1 % (ref 0–1.5)
BH CV REST NUCLEAR ISOTOPE DOSE: 33 MCI
BH CV STRESS BP STAGE 1: NORMAL
BH CV STRESS COMMENTS STAGE 1: NORMAL
BH CV STRESS DOSE REGADENOSON STAGE 1: 0.4
BH CV STRESS DURATION MIN STAGE 1: 0
BH CV STRESS DURATION SEC STAGE 1: 10
BH CV STRESS HR STAGE 1: 88
BH CV STRESS NUCLEAR ISOTOPE DOSE: 33 MCI
BH CV STRESS PROTOCOL 1: NORMAL
BH CV STRESS RECOVERY BP: NORMAL MMHG
BH CV STRESS RECOVERY HR: 76 BPM
BH CV STRESS STAGE 1: 1
BILIRUB SERPL-MCNC: 0.2 MG/DL (ref 0–1.2)
BUN SERPL-MCNC: 12 MG/DL (ref 8–23)
BUN/CREAT SERPL: 19 (ref 7–25)
CALCIUM SPEC-SCNC: 9.7 MG/DL (ref 8.6–10.5)
CHLORIDE SERPL-SCNC: 103 MMOL/L (ref 98–107)
CO2 SERPL-SCNC: 22.8 MMOL/L (ref 22–29)
CREAT SERPL-MCNC: 0.63 MG/DL (ref 0.57–1)
DEPRECATED RDW RBC AUTO: 45.5 FL (ref 37–54)
EOSINOPHIL # BLD AUTO: 0.27 10*3/MM3 (ref 0–0.4)
EOSINOPHIL NFR BLD AUTO: 4.9 % (ref 0.3–6.2)
ERYTHROCYTE [DISTWIDTH] IN BLOOD BY AUTOMATED COUNT: 12.9 % (ref 12.3–15.4)
GFR SERPL CREATININE-BSD FRML MDRD: 113 ML/MIN/1.73
GLOBULIN UR ELPH-MCNC: 3.5 GM/DL
GLUCOSE SERPL-MCNC: 126 MG/DL (ref 65–99)
HCT VFR BLD AUTO: 34.4 % (ref 34–46.6)
HGB BLD-MCNC: 11 G/DL (ref 12–15.9)
IMM GRANULOCYTES # BLD AUTO: 0.03 10*3/MM3 (ref 0–0.05)
IMM GRANULOCYTES NFR BLD AUTO: 0.5 % (ref 0–0.5)
LV EF NUC BP: 78 %
LYMPHOCYTES # BLD AUTO: 1.87 10*3/MM3 (ref 0.7–3.1)
LYMPHOCYTES NFR BLD AUTO: 34.1 % (ref 19.6–45.3)
MAXIMAL PREDICTED HEART RATE: 149 BPM
MCH RBC QN AUTO: 30.6 PG (ref 26.6–33)
MCHC RBC AUTO-ENTMCNC: 32 G/DL (ref 31.5–35.7)
MCV RBC AUTO: 95.6 FL (ref 79–97)
MONOCYTES # BLD AUTO: 0.44 10*3/MM3 (ref 0.1–0.9)
MONOCYTES NFR BLD AUTO: 8 % (ref 5–12)
NEUTROPHILS NFR BLD AUTO: 2.81 10*3/MM3 (ref 1.7–7)
NEUTROPHILS NFR BLD AUTO: 51.4 % (ref 42.7–76)
NRBC BLD AUTO-RTO: 0 /100 WBC (ref 0–0.2)
PERCENT MAX PREDICTED HR: 59.06 %
PLATELET # BLD AUTO: 267 10*3/MM3 (ref 140–450)
PMV BLD AUTO: 10.4 FL (ref 6–12)
POTASSIUM SERPL-SCNC: 4.4 MMOL/L (ref 3.5–5.2)
PROT SERPL-MCNC: 7.7 G/DL (ref 6–8.5)
QT INTERVAL: 368 MS
RBC # BLD AUTO: 3.6 10*6/MM3 (ref 3.77–5.28)
SODIUM SERPL-SCNC: 136 MMOL/L (ref 136–145)
STRESS BASELINE BP: NORMAL MMHG
STRESS BASELINE HR: 76 BPM
STRESS PERCENT HR: 69 %
STRESS POST EXERCISE DUR SEC: 10 SEC
STRESS POST PEAK BP: NORMAL MMHG
STRESS POST PEAK HR: 88 BPM
STRESS TARGET HR: 127 BPM
TROPONIN T SERPL-MCNC: <0.01 NG/ML (ref 0–0.03)
WBC # BLD AUTO: 5.48 10*3/MM3 (ref 3.4–10.8)

## 2021-03-22 PROCEDURE — 99283 EMERGENCY DEPT VISIT LOW MDM: CPT

## 2021-03-22 PROCEDURE — 93005 ELECTROCARDIOGRAM TRACING: CPT | Performed by: EMERGENCY MEDICINE

## 2021-03-22 PROCEDURE — 25010000002 REGADENOSON 0.4 MG/5ML SOLUTION: Performed by: INTERNAL MEDICINE

## 2021-03-22 PROCEDURE — 85025 COMPLETE CBC W/AUTO DIFF WBC: CPT | Performed by: EMERGENCY MEDICINE

## 2021-03-22 PROCEDURE — 0 RUBIDIUM CHLORIDE: Performed by: INTERNAL MEDICINE

## 2021-03-22 PROCEDURE — 73502 X-RAY EXAM HIP UNI 2-3 VIEWS: CPT

## 2021-03-22 PROCEDURE — 78492 MYOCRD IMG PET MLT RST&STRS: CPT

## 2021-03-22 PROCEDURE — 84484 ASSAY OF TROPONIN QUANT: CPT | Performed by: EMERGENCY MEDICINE

## 2021-03-22 PROCEDURE — 93016 CV STRESS TEST SUPVJ ONLY: CPT | Performed by: INTERNAL MEDICINE

## 2021-03-22 PROCEDURE — 78492 MYOCRD IMG PET MLT RST&STRS: CPT | Performed by: INTERNAL MEDICINE

## 2021-03-22 PROCEDURE — 93017 CV STRESS TEST TRACING ONLY: CPT

## 2021-03-22 PROCEDURE — 93018 CV STRESS TEST I&R ONLY: CPT | Performed by: INTERNAL MEDICINE

## 2021-03-22 PROCEDURE — 73030 X-RAY EXAM OF SHOULDER: CPT

## 2021-03-22 PROCEDURE — 80053 COMPREHEN METABOLIC PANEL: CPT | Performed by: EMERGENCY MEDICINE

## 2021-03-22 PROCEDURE — 93010 ELECTROCARDIOGRAM REPORT: CPT | Performed by: INTERNAL MEDICINE

## 2021-03-22 PROCEDURE — A9555 RB82 RUBIDIUM: HCPCS | Performed by: INTERNAL MEDICINE

## 2021-03-22 RX ORDER — OXYCODONE HYDROCHLORIDE AND ACETAMINOPHEN 5; 325 MG/1; MG/1
1 TABLET ORAL ONCE
Status: COMPLETED | OUTPATIENT
Start: 2021-03-22 | End: 2021-03-22

## 2021-03-22 RX ADMIN — OXYCODONE HYDROCHLORIDE AND ACETAMINOPHEN 1 TABLET: 5; 325 TABLET ORAL at 12:16

## 2021-03-22 RX ADMIN — REGADENOSON 0.4 MG: 0.08 INJECTION, SOLUTION INTRAVENOUS at 09:28

## 2021-03-22 NOTE — ED TRIAGE NOTES
Left shoulder pain radiates to tip of fingers.  Left hip pain radiates to toes.  This has been going on for a year.  nki    Patient was placed in face mask during first look triage.  Patient was wearing a face mask throughout encounter.  I wore personal protective equipment throughout the encounter.  Hand hygiene was performed before and after patient encounter.

## 2021-03-22 NOTE — ED PROVIDER NOTES
EMERGENCY DEPARTMENT ENCOUNTER    Room Number:  31/31  Date of encounter:  3/22/2021  PCP: Michele Soto MD  Historian: Patient      I used full protective equipment while examining this patient.  This includes face mask, gloves and protective eyewear.  I washed my hands before entering the room and immediately upon leaving the room      HPI:  Chief Complaint: Left arm and right leg pain  A complete HPI/ROS/PMH/PSH/SH/FH are unobtainable due to: Nothing    Context: Zaira Menendez is a 70 y.o. female who presents to the ED c/o acute on chronic left arm and right leg pain.  Patient states the symptoms have been present for greater than 1 year.  Patient states she was at the hospital today getting outpatient testing, when she decided to check out her left shoulder and right hip.  Patient has been seen by neurology and neurosurgery in the past for these complaints.  Patient was felt not to be a good surgical candidate secondary to body habitus.  Patient's left arm pain is worse with abduction.  Patient states she does have intermittent paresthesias in the left arm.  Patient denies any urinary incontinence or saddle paresthesias.  Patient does take gabapentin and is in pain management.  She states she is due for epidurals next week in her cervical spine.  She does have a history of diabetes, CAD, heart failure.  Patient states the pain does seem to radiate from her neck down her left arm, and occasionally involve the chest.  She states this is a chronic finding.  She denies any shortness of breath, nausea, vomiting, diaphoresis.    Review of Medical Records  I reviewed patient's last office visit with her neurologist from 2/1/2021.  Patient saw Dr. Larry.  Patient was noted to have severe lumbar spinal stenosis, as well as stable white matter changes on a cervical MRI.  Neurology felt there is no further treatment at this time.  In addition the did not suspect any vascular etiology.    PAST MEDICAL  HISTORY  Active Ambulatory Problems     Diagnosis Date Noted   • Vertigo 01/11/2019   • Diabetes mellitus (CMS/HCC) 01/13/2019   • Constipation 01/13/2019   • Pulmonary emphysema (CMS/HCC) 03/02/2021   • Acute on chronic systolic congestive heart failure (CMS/HCC) 03/02/2021   • Body mass index (BMI) of 45.0-49.9 in adult (CMS/HCC) 03/02/2021   • Stage 3a chronic kidney disease (CMS/HCC) 03/02/2021   • Benign essential hypertension 03/02/2021   • Coronary arteriosclerosis 03/02/2021   • Neurogenic claudication 03/24/2015   • Presence of drug coated stent in LAD coronary artery 03/02/2021   • Coronary artery disease involving native coronary artery of native heart with angina pectoris (CMS/HCC) 03/02/2021     Resolved Ambulatory Problems     Diagnosis Date Noted   • No Resolved Ambulatory Problems     Past Medical History:   Diagnosis Date   • Acid reflux    • Allergic rhinitis    • Anemia    • Anxiety    • Arthritis    • Asthma    • Carotid artery stenosis    • Carpal tunnel syndrome of left wrist    • Cataract    • Cellulitis    • Cervical radiculopathy    • CHF (congestive heart failure) (CMS/HCC)    • Chronic kidney disease    • Colon polyp    • COPD (chronic obstructive pulmonary disease) (CMS/HCC)    • Coronary artery disease    • DDD (degenerative disc disease), lumbar    • Degenerative joint disease    • Diverticula of colon    • DVT (deep vein thrombosis) in pregnancy    • Dyspepsia    • GERD (gastroesophageal reflux disease)    • Gout    • H/O fibromyalgia    • Heart attack (CMS/HCC)    • High blood pressure    • High cholesterol    • Hyperglycemia    • Hyperlipidemia    • Hypertension    • Hypokalemia    • Hypomagnesemia    • Hypothyroidism    • Kidney failure    • Lumbar radiculopathy    • Myalgia    • Obstructive sleep apnea    • Osteoporosis    • Rotator cuff tendonitis          PAST SURGICAL HISTORY  Past Surgical History:   Procedure Laterality Date   • BACK SURGERY     • CARDIAC CATHETERIZATION     •  CORONARY ANGIOPLASTY WITH STENT PLACEMENT     • HAND SURGERY     • HYSTERECTOMY     • MASTECTOMY, PARTIAL     • REPLACEMENT TOTAL KNEE     • TUBAL ABDOMINAL LIGATION           FAMILY HISTORY  Family History   Problem Relation Age of Onset   • Hypertension Mother    • Diabetes Mother    • Heart disease Father 59   • Sudden death Father    • Hypertension Father    • Aneurysm Father    • Heart attack Father 55   • Stroke Sister    • Hypertension Sister    • Hypertension Brother    • Cancer Brother    • Cancer Maternal Grandmother          SOCIAL HISTORY  Social History     Socioeconomic History   • Marital status:      Spouse name: Not on file   • Number of children: Not on file   • Years of education: Not on file   • Highest education level: Not on file   Tobacco Use   • Smoking status: Former Smoker     Types: Cigarettes   • Smokeless tobacco: Never Used   • Tobacco comment: CAFF USE   Substance and Sexual Activity   • Alcohol use: No         ALLERGIES  Morphine, Ibuprofen, Naproxen, Baclofen, Iodine, and Latex        REVIEW OF SYSTEMS  All systems reviewed and negative except for those discussed in HPI.       PHYSICAL EXAM    I have reviewed the triage vital signs and nursing notes.    ED Triage Vitals   Temp Heart Rate Resp BP SpO2   03/22/21 1010 03/22/21 1010 03/22/21 1010 03/22/21 1017 03/22/21 1010   96.5 °F (35.8 °C) 91 16 177/87 98 %      Temp src Heart Rate Source Patient Position BP Location FiO2 (%)   03/22/21 1010 03/22/21 1010 -- -- --   Tympanic Monitor          Physical Exam  GENERAL: Alert, oriented, not distressed  HENT: head atraumatic, no nuchal rigidity  EYES: no scleral icterus, EOMI  CV: regular rhythm, regular rate, no murmur  RESPIRATORY: normal effort, CTA  ABDOMEN: soft, nontender  MUSCULOSKELETAL: no deformity, FROM, no calf swelling or tenderness  NEURO: alert, 5/5 strength in upper and lower bilateral extremities.  SKIN: warm, dry        LAB RESULTS  Recent Results (from the past  24 hour(s))   Stress Test With Pet Myocardial Perfusion (MULTI STUDY, REST AND STRESS)    Collection Time: 03/22/21 10:29 AM   Result Value Ref Range    BH CV REST NUCLEAR ISOTOPE DOSE 33.0 mCi    BH CV STRESS NUCLEAR ISOTOPE DOSE 33.0 mCi    BH CV STRESS PROTOCOL 1 Pharmacologic     Stage 1 1     HR Stage 1 88     BP Stage 1 141/58     Duration Min Stage 1 0     Duration Sec Stage 1 10     Stress Dose Regadenoson Stage 1 0.4     Stress Comments Stage 1 10 sec bolus injection     Baseline HR 76 bpm    Baseline /69 mmHg    Peak HR 88 bpm    Percent Max Pred HR 59.06 %    Percent Target HR 69 %    Peak /58 mmHg    Recovery HR 76 bpm    Recovery /67 mmHg    Target HR (85%) 127 bpm    Max. Pred. HR (100%) 149 bpm    Exercise duration (sec) 10 sec    Nuc Stress EF 78 %   ECG 12 Lead    Collection Time: 03/22/21 12:10 PM   Result Value Ref Range    QT Interval 368 ms   Comprehensive Metabolic Panel    Collection Time: 03/22/21 12:13 PM    Specimen: Blood   Result Value Ref Range    Glucose 126 (H) 65 - 99 mg/dL    BUN 12 8 - 23 mg/dL    Creatinine 0.63 0.57 - 1.00 mg/dL    Sodium 136 136 - 145 mmol/L    Potassium 4.4 3.5 - 5.2 mmol/L    Chloride 103 98 - 107 mmol/L    CO2 22.8 22.0 - 29.0 mmol/L    Calcium 9.7 8.6 - 10.5 mg/dL    Total Protein 7.7 6.0 - 8.5 g/dL    Albumin 4.20 3.50 - 5.20 g/dL    ALT (SGPT) 10 1 - 33 U/L    AST (SGOT) 12 1 - 32 U/L    Alkaline Phosphatase 101 39 - 117 U/L    Total Bilirubin 0.2 0.0 - 1.2 mg/dL    eGFR  African Amer 113 >60 mL/min/1.73    Globulin 3.5 gm/dL    A/G Ratio 1.2 g/dL    BUN/Creatinine Ratio 19.0 7.0 - 25.0    Anion Gap 10.2 5.0 - 15.0 mmol/L   Troponin    Collection Time: 03/22/21 12:13 PM    Specimen: Blood   Result Value Ref Range    Troponin T <0.010 0.000 - 0.030 ng/mL   CBC Auto Differential    Collection Time: 03/22/21 12:13 PM    Specimen: Blood   Result Value Ref Range    WBC 5.48 3.40 - 10.80 10*3/mm3    RBC 3.60 (L) 3.77 - 5.28 10*6/mm3     Hemoglobin 11.0 (L) 12.0 - 15.9 g/dL    Hematocrit 34.4 34.0 - 46.6 %    MCV 95.6 79.0 - 97.0 fL    MCH 30.6 26.6 - 33.0 pg    MCHC 32.0 31.5 - 35.7 g/dL    RDW 12.9 12.3 - 15.4 %    RDW-SD 45.5 37.0 - 54.0 fl    MPV 10.4 6.0 - 12.0 fL    Platelets 267 140 - 450 10*3/mm3    Neutrophil % 51.4 42.7 - 76.0 %    Lymphocyte % 34.1 19.6 - 45.3 %    Monocyte % 8.0 5.0 - 12.0 %    Eosinophil % 4.9 0.3 - 6.2 %    Basophil % 1.1 0.0 - 1.5 %    Immature Grans % 0.5 0.0 - 0.5 %    Neutrophils, Absolute 2.81 1.70 - 7.00 10*3/mm3    Lymphocytes, Absolute 1.87 0.70 - 3.10 10*3/mm3    Monocytes, Absolute 0.44 0.10 - 0.90 10*3/mm3    Eosinophils, Absolute 0.27 0.00 - 0.40 10*3/mm3    Basophils, Absolute 0.06 0.00 - 0.20 10*3/mm3    Immature Grans, Absolute 0.03 0.00 - 0.05 10*3/mm3    nRBC 0.0 0.0 - 0.2 /100 WBC       Ordered the above labs and independently reviewed the results.        RADIOLOGY  XR Shoulder 2+ View Left    Result Date: 3/22/2021  X-RAY SHOULDER TWO+ VIEW LEFT, X-RAY HIP WITH OR WITHOUT PELVIS TWO-THREE VIEWS RIGHT-  CLINICAL INFORMATION: Shoulder pain.  LEFT SHOULDER FINDINGS: There is glenohumeral joint space loss with bone-on-bone configuration, articular irregularity and periarticular bone hypertrophy. The acromioclavicular joint is satisfactory in alignment with only minimal bone hypertrophy seen. No shoulder fracture or dislocation and the surrounding soft tissues have a satisfactory appearance.  CONCLUSION: Joint degeneration as described above.  AP PELVIS RIGHT HIP FINDINGS: Both hip joints demonstrate mild narrowing. No right-sided fracture, dislocation, bone lesion nor avascular necrosis. Vascular arterial calcifications within the soft tissues.  CONCLUSION: Degenerative change right hip as described above.  This report was finalized on 3/22/2021 11:51 AM by Dr. Lukasz Carey M.D.      Stress Test With Pet Myocardial Perfusion (MULTI STUDY, REST AND STRESS)    Result Date: 3/22/2021  · GI artifact is  present. · Myocardial perfusion imaging indicates a normal myocardial perfusion study with no evidence of ischemia. · Left ventricular ejection fraction is hyperdynamic (Calculated EF > 70%). . · Impressions are consistent with a low risk study.      XR Hip With or Without Pelvis 2 - 3 View Right    Result Date: 3/22/2021  X-RAY SHOULDER TWO+ VIEW LEFT, X-RAY HIP WITH OR WITHOUT PELVIS TWO-THREE VIEWS RIGHT-  CLINICAL INFORMATION: Shoulder pain.  LEFT SHOULDER FINDINGS: There is glenohumeral joint space loss with bone-on-bone configuration, articular irregularity and periarticular bone hypertrophy. The acromioclavicular joint is satisfactory in alignment with only minimal bone hypertrophy seen. No shoulder fracture or dislocation and the surrounding soft tissues have a satisfactory appearance.  CONCLUSION: Joint degeneration as described above.  AP PELVIS RIGHT HIP FINDINGS: Both hip joints demonstrate mild narrowing. No right-sided fracture, dislocation, bone lesion nor avascular necrosis. Vascular arterial calcifications within the soft tissues.  CONCLUSION: Degenerative change right hip as described above.  This report was finalized on 3/22/2021 11:51 AM by Dr. Lukasz Carey M.D.        I ordered the above noted radiological studies. Reviewed by me and discussed with radiologist.  See dictation for official radiology interpretation.    MEDICATIONS GIVEN IN ER    Medications   oxyCODONE-acetaminophen (PERCOCET) 5-325 MG per tablet 1 tablet (1 tablet Oral Given 3/22/21 1216)         PROGRESS, DATA ANALYSIS, CONSULTS, AND MEDICAL DECISION MAKING    All labs have been independently reviewed by me.  All radiology studies have been reviewed by me and discussed with radiologist dictating the report.   EKG's independently viewed and interpreted by me.  Discussion below represents my analysis of pertinent findings related to patient's condition, differential diagnosis, treatment plan and final disposition.    I have  discussed case with Dr. Mcghee, emergency room physician.  He has performed his own bedside examination and agrees with treatment plan.    ED Course as of Mar 22 1818   Mon Mar 22, 2021   1150 Left shoulder and right hip x-rays interpreted by myself show no acute fracture or significant malalignment will await final radiologist interpretation.    [EE]   1157 Patient presents with acute on chronic left arm and right leg pain.  Patient has a long history of similar complaints.  Patient has no neuro deficits on exam.  She is a heart patient, plan to check EKG, troponin to rule out cardiac etiology.    [EE]   1254 Creatinine: 0.63 [EE]   1254 Hemoglobin(!): 11.0 [EE]   1254 Troponin T: <0.010 [EE]   1306 No evidence of any cardiac involvement with patient's left arm pain.  Patient has had chronic left arm and right hip pain x1 year.  She is in pain management.  She has been recently cleared by neurology.  Plan to discharge.    [EE]   1817 EKG interpreted by myself.  Time 1415.  Sinus rhythm, 70 bpm.  Normal P/SANTIAGO.  QRS is normal with normal borderline right axis deviation.  No significant ST abnormalities.  Similar to previous EKG from 3/2/2021.    [EE]      ED Course User Index  [EE] Pito Dale PA       AS OF 18:18 EDT VITALS:    BP - 171/91  HR - 91  TEMP - 96.5 °F (35.8 °C) (Tympanic)  O2 SATS - 97%        DIAGNOSIS  Final diagnoses:   Left cervical radiculopathy   Right lumbar radiculopathy         DISPOSITION  Discharged           Pito Dale PA  03/22/21 1818

## 2021-03-22 NOTE — ED PROVIDER NOTES
Brief history of present illness: 70-year-old female presents with left arm and right hip pain that has been gradual and progressive over quite some time.  Patient was getting her testing today and decided to stop by the emergency department for further evaluation.  No acute trauma reported.    Physical exam:   ED Triage Vitals   Temp Heart Rate Resp BP SpO2   03/22/21 1010 03/22/21 1010 03/22/21 1010 03/22/21 1017 03/22/21 1010   96.5 °F (35.8 °C) 91 16 177/87 98 %      Temp src Heart Rate Source Patient Position BP Location FiO2 (%)   03/22/21 1010 03/22/21 1010 -- -- --   Tympanic Monitor        Appropriate.  Very pleasant.  No acute distress.  No respiratory distress or tachypnea.  Patient was noted to be ambulating in the emergency department moving all 4 extremities using her walker.    MDM:    Results for orders placed or performed during the hospital encounter of 03/22/21   Comprehensive Metabolic Panel    Specimen: Blood   Result Value Ref Range    Glucose 126 (H) 65 - 99 mg/dL    BUN 12 8 - 23 mg/dL    Creatinine 0.63 0.57 - 1.00 mg/dL    Sodium 136 136 - 145 mmol/L    Potassium 4.4 3.5 - 5.2 mmol/L    Chloride 103 98 - 107 mmol/L    CO2 22.8 22.0 - 29.0 mmol/L    Calcium 9.7 8.6 - 10.5 mg/dL    Total Protein 7.7 6.0 - 8.5 g/dL    Albumin 4.20 3.50 - 5.20 g/dL    ALT (SGPT) 10 1 - 33 U/L    AST (SGOT) 12 1 - 32 U/L    Alkaline Phosphatase 101 39 - 117 U/L    Total Bilirubin 0.2 0.0 - 1.2 mg/dL    eGFR  African Amer 113 >60 mL/min/1.73    Globulin 3.5 gm/dL    A/G Ratio 1.2 g/dL    BUN/Creatinine Ratio 19.0 7.0 - 25.0    Anion Gap 10.2 5.0 - 15.0 mmol/L   Troponin    Specimen: Blood   Result Value Ref Range    Troponin T <0.010 0.000 - 0.030 ng/mL   CBC Auto Differential    Specimen: Blood   Result Value Ref Range    WBC 5.48 3.40 - 10.80 10*3/mm3    RBC 3.60 (L) 3.77 - 5.28 10*6/mm3    Hemoglobin 11.0 (L) 12.0 - 15.9 g/dL    Hematocrit 34.4 34.0 - 46.6 %    MCV 95.6 79.0 - 97.0 fL    MCH 30.6 26.6 - 33.0  pg    MCHC 32.0 31.5 - 35.7 g/dL    RDW 12.9 12.3 - 15.4 %    RDW-SD 45.5 37.0 - 54.0 fl    MPV 10.4 6.0 - 12.0 fL    Platelets 267 140 - 450 10*3/mm3    Neutrophil % 51.4 42.7 - 76.0 %    Lymphocyte % 34.1 19.6 - 45.3 %    Monocyte % 8.0 5.0 - 12.0 %    Eosinophil % 4.9 0.3 - 6.2 %    Basophil % 1.1 0.0 - 1.5 %    Immature Grans % 0.5 0.0 - 0.5 %    Neutrophils, Absolute 2.81 1.70 - 7.00 10*3/mm3    Lymphocytes, Absolute 1.87 0.70 - 3.10 10*3/mm3    Monocytes, Absolute 0.44 0.10 - 0.90 10*3/mm3    Eosinophils, Absolute 0.27 0.00 - 0.40 10*3/mm3    Basophils, Absolute 0.06 0.00 - 0.20 10*3/mm3    Immature Grans, Absolute 0.03 0.00 - 0.05 10*3/mm3    nRBC 0.0 0.0 - 0.2 /100 WBC   ECG 12 Lead   Result Value Ref Range    QT Interval 368 ms     XR Shoulder 2+ View Left    Result Date: 3/22/2021  Narrative: X-RAY SHOULDER TWO+ VIEW LEFT, X-RAY HIP WITH OR WITHOUT PELVIS TWO-THREE VIEWS RIGHT-  CLINICAL INFORMATION: Shoulder pain.  LEFT SHOULDER FINDINGS: There is glenohumeral joint space loss with bone-on-bone configuration, articular irregularity and periarticular bone hypertrophy. The acromioclavicular joint is satisfactory in alignment with only minimal bone hypertrophy seen. No shoulder fracture or dislocation and the surrounding soft tissues have a satisfactory appearance.  CONCLUSION: Joint degeneration as described above.  AP PELVIS RIGHT HIP FINDINGS: Both hip joints demonstrate mild narrowing. No right-sided fracture, dislocation, bone lesion nor avascular necrosis. Vascular arterial calcifications within the soft tissues.  CONCLUSION: Degenerative change right hip as described above.  This report was finalized on 3/22/2021 11:51 AM by Dr. Lukasz Carey M.D.      XR Hip With or Without Pelvis 2 - 3 View Right    Result Date: 3/22/2021  Narrative: X-RAY SHOULDER TWO+ VIEW LEFT, X-RAY HIP WITH OR WITHOUT PELVIS TWO-THREE VIEWS RIGHT-  CLINICAL INFORMATION: Shoulder pain.  LEFT SHOULDER FINDINGS: There is  glenohumeral joint space loss with bone-on-bone configuration, articular irregularity and periarticular bone hypertrophy. The acromioclavicular joint is satisfactory in alignment with only minimal bone hypertrophy seen. No shoulder fracture or dislocation and the surrounding soft tissues have a satisfactory appearance.  CONCLUSION: Joint degeneration as described above.  AP PELVIS RIGHT HIP FINDINGS: Both hip joints demonstrate mild narrowing. No right-sided fracture, dislocation, bone lesion nor avascular necrosis. Vascular arterial calcifications within the soft tissues.  CONCLUSION: Degenerative change right hip as described above.  This report was finalized on 3/22/2021 11:51 AM by Dr. Lukasz Carey M.D.      Have personally reviewed the EKG time 1210 concomitant with treatment and noted to be sinus rhythm with no acute ischemic change.    Reviewed labs and x-ray results with patient.  Patient agreeable with plan for discharge and outpatient follow-up with orthopedics for her recurring gradually worsening chronic orthopedic issues.    I have seen and personally evaluated this patient, discussed the case with the treating advanced practice provider, and reviewed their note. I was involved in the medical decision making during the evaluation, testing and disposition planning for this patient.     Ryan Mcghee MD  03/22/21 5306

## 2021-03-22 NOTE — ED NOTES
Pt verbalized understanding to f/u with her pcp and ortho surgery.      David Alfaro RN  03/22/21 5532

## 2021-09-29 ENCOUNTER — TRANSCRIBE ORDERS (OUTPATIENT)
Dept: ADMINISTRATIVE | Facility: HOSPITAL | Age: 71
End: 2021-09-29

## 2021-09-29 DIAGNOSIS — I27.20 PULMONARY HYPERTENSION (HCC): Primary | ICD-10-CM

## 2021-10-13 ENCOUNTER — APPOINTMENT (OUTPATIENT)
Dept: CARDIOLOGY | Facility: HOSPITAL | Age: 71
End: 2021-10-13

## 2024-08-19 ENCOUNTER — APPOINTMENT (OUTPATIENT)
Dept: GENERAL RADIOLOGY | Facility: HOSPITAL | Age: 74
End: 2024-08-19
Payer: MEDICARE

## 2024-08-19 ENCOUNTER — APPOINTMENT (OUTPATIENT)
Dept: CT IMAGING | Facility: HOSPITAL | Age: 74
End: 2024-08-19
Payer: MEDICARE

## 2024-08-19 ENCOUNTER — HOSPITAL ENCOUNTER (EMERGENCY)
Facility: HOSPITAL | Age: 74
Discharge: HOME | End: 2024-08-19
Attending: EMERGENCY MEDICINE | Admitting: EMERGENCY MEDICINE
Payer: MEDICARE

## 2024-08-19 VITALS
BODY MASS INDEX: 47.12 KG/M2 | DIASTOLIC BLOOD PRESSURE: 72 MMHG | HEIGHT: 60 IN | RESPIRATION RATE: 16 BRPM | SYSTOLIC BLOOD PRESSURE: 150 MMHG | TEMPERATURE: 98.6 F | HEART RATE: 68 BPM | OXYGEN SATURATION: 97 % | WEIGHT: 240 LBS

## 2024-08-19 DIAGNOSIS — M25.561 ACUTE PAIN OF BOTH KNEES: ICD-10-CM

## 2024-08-19 DIAGNOSIS — M25.512 ACUTE PAIN OF LEFT SHOULDER: ICD-10-CM

## 2024-08-19 DIAGNOSIS — M54.2 ACUTE NECK PAIN: ICD-10-CM

## 2024-08-19 DIAGNOSIS — M79.642 BILATERAL HAND PAIN: ICD-10-CM

## 2024-08-19 DIAGNOSIS — M25.562 ACUTE PAIN OF BOTH KNEES: ICD-10-CM
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
## 2024-08-19 DIAGNOSIS — M79.641 BILATERAL HAND PAIN: ICD-10-CM

## 2024-08-19 DIAGNOSIS — S09.90XA CLOSED HEAD INJURY, INITIAL ENCOUNTER: ICD-10-CM

## 2024-08-19 PROCEDURE — 73120 X-RAY EXAM OF HAND: CPT

## 2024-08-19 PROCEDURE — 73560 X-RAY EXAM OF KNEE 1 OR 2: CPT

## 2024-08-19 PROCEDURE — 99284 EMERGENCY DEPT VISIT MOD MDM: CPT

## 2024-08-19 PROCEDURE — 70450 CT HEAD/BRAIN W/O DYE: CPT

## 2024-08-19 PROCEDURE — 73030 X-RAY EXAM OF SHOULDER: CPT

## 2024-08-19 PROCEDURE — 72125 CT NECK SPINE W/O DYE: CPT

## 2024-08-19 RX ORDER — LIDOCAINE 50 MG/G
1 PATCH TOPICAL EVERY 24 HOURS
Qty: 30 PATCH | Refills: 0 | Status: SHIPPED | OUTPATIENT
Start: 2024-08-19

## 2024-08-19 NOTE — ED PROVIDER NOTES
EMERGENCY DEPARTMENT ENCOUNTER  Room Number:  14/14  Date of encounter:  8/19/2024  PCP: Provider, No Known  Patient Care Team:  Provider, No Known as PCP - General     HPI:  Context: Zaira Menendez is a 73 y.o. female who presents to the ED c/o chief complaint of fall.  Patient reports that she had 2 falls tonight.  First fall happened when she was attempting to get out of bed, reports that she fell because she has chronic pain in her knees, no head injury at that time, did report that she injured her left shoulder as well as has increased pain in her right knee after that fall.  Patient reports that her son had to help her up from the ground after the fall.  Patient reports that she had a second fall while preparing food, reached out for something and lost her balance, fell backwards and struck her head.  Patient denies any loss consciousness but does report that she was dazed and confused for a minute, no visual disturbances, no nausea vomiting.  Patient did have a headache, headache since resolved.  Patient endorses neck pain, no other back pain, no radicular pain.  Patient is also complaining of pain in bilateral hands as well as bilateral knees.    MEDICAL HISTORY REVIEW  Reviewed in EPIC    PAST MEDICAL HISTORY  Active Ambulatory Problems     Diagnosis Date Noted    Vertigo 01/11/2019    Diabetes mellitus 01/13/2019    Constipation 01/13/2019    Pulmonary emphysema 03/02/2021    Acute on chronic systolic congestive heart failure 03/02/2021    Body mass index (BMI) of 45.0-49.9 in adult 03/02/2021    Stage 3a chronic kidney disease 03/02/2021    Benign essential hypertension 03/02/2021    Coronary arteriosclerosis 03/02/2021    Neurogenic claudication 03/24/2015    Presence of drug coated stent in LAD coronary artery 03/02/2021    Coronary artery disease involving native coronary artery of native heart with angina pectoris 03/02/2021     Resolved Ambulatory Problems     Diagnosis Date Noted    No Resolved  Ambulatory Problems     Past Medical History:   Diagnosis Date    Acid reflux     Allergic rhinitis     Anemia     Anxiety     Arthritis     Asthma     Carotid artery stenosis     Carpal tunnel syndrome of left wrist     Cataract     Cellulitis     Cervical radiculopathy     CHF (congestive heart failure)     Chronic kidney disease     Colon polyp     COPD (chronic obstructive pulmonary disease)     Coronary artery disease     DDD (degenerative disc disease), lumbar     Degenerative joint disease     Diverticula of colon     DVT (deep vein thrombosis) in pregnancy     Dyspepsia     GERD (gastroesophageal reflux disease)     Gout     H/O fibromyalgia     Heart attack     High blood pressure     High cholesterol     Hyperglycemia     Hyperlipidemia     Hypertension     Hypokalemia     Hypomagnesemia     Hypothyroidism     Kidney failure     Lumbar radiculopathy     Myalgia     Obstructive sleep apnea     Osteoporosis     Rotator cuff tendonitis        PAST SURGICAL HISTORY  Past Surgical History:   Procedure Laterality Date    BACK SURGERY      CARDIAC CATHETERIZATION      CORONARY ANGIOPLASTY WITH STENT PLACEMENT      HAND SURGERY      HYSTERECTOMY      MASTECTOMY, PARTIAL      REPLACEMENT TOTAL KNEE      TUBAL ABDOMINAL LIGATION         FAMILY HISTORY  Family History   Problem Relation Age of Onset    Hypertension Mother     Diabetes Mother     Heart disease Father 59    Sudden death Father     Hypertension Father     Aneurysm Father     Heart attack Father 55    Stroke Sister     Hypertension Sister     Hypertension Brother     Cancer Brother     Cancer Maternal Grandmother        SOCIAL HISTORY  Social History     Socioeconomic History    Marital status:    Tobacco Use    Smoking status: Former     Types: Cigarettes    Smokeless tobacco: Never    Tobacco comments:     CAFF USE   Substance and Sexual Activity    Alcohol use: No       ALLERGIES  Morphine, Ibuprofen, Naproxen, Baclofen, Iodine, and  Latex    The patient's allergies have been reviewed    REVIEW OF SYSTEMS  All systems reviewed and negative except for those discussed in HPI.     PHYSICAL EXAM  I have reviewed the triage vital signs and nursing notes.  ED Triage Vitals [08/19/24 0017]   Temp Heart Rate Resp BP SpO2   98.6 °F (37 °C) 72 16 128/81 98 %      Temp src Heart Rate Source Patient Position BP Location FiO2 (%)   Oral Monitor Lying Right arm --       General: No acute distress.  HENT: NCAT, PERRL, Nares patent.  Eyes: no scleral icterus.  Neck: trachea midline, no ROM limitations.  Cervical spine: No step-offs or deformities, no midline tenderness to palpation, bilateral paraspinal tenderness to palpation.  CV: regular rhythm, regular rate.  Respiratory: normal effort, CTAB.  Abdomen: soft, nondistended, NTTP, no rebound tenderness, no guarding or rigidity.  Musculoskeletal: no deformity.  Left shoulder, no crepitus or deformity, lateral tenderness to palpation, neurovascular intact distally.  Bilateral hands are normal in appearance, no crepitus or deformity.  Bilateral knees are normal in appearance, no crepitus or deformity, no swelling or effusion, anterior tenderness to palpation, neurovascular intact distally.  Neuro: alert, moves all extremities, follows commands.  Skin: warm, dry.    LAB RESULTS  No results found for this or any previous visit (from the past 24 hour(s)).    I ordered the above labs and reviewed the results.    RADIOLOGY  CT Cervical Spine Without Contrast    Result Date: 8/19/2024  CT OF THE CERVICAL SPINE  HISTORY: Fall  COMPARISON: September 17, 2020  TECHNIQUE: Axial CT imaging was obtained through the cervical spine. Coronal and sagittal reformatted images were obtained.  FINDINGS: No acute fracture or subluxation of the cervical spine is seen. Straightening/reversal of the normal cervical curvature was also present on prior imaging from 2020. There is no prevertebral soft tissue swelling. Intervertebral disc  space narrowing is noted at multiple levels. Images through the lung apices demonstrate background emphysematous changes. Canal stenosis is probably most significant at C5-C6 and C6-C7. Bilateral neural foraminal narrowing is present at multiple levels.      No acute fracture or subluxation identified.  Radiation dose reduction techniques were utilized, including automated exposure control and exposure modulation based on body size.   This report was finalized on 8/19/2024 2:43 AM by Dr. Selam Kaur M.D on Workstation: [id number]      CT Head Without Contrast    Result Date: 8/19/2024  CT OF THE HEAD WITHOUT CONTRAST  HISTORY: Fall  COMPARISON: January 10, 2019  TECHNIQUE: Axial CT imaging was obtained through the brain. No IV contrast was administered.  FINDINGS: No acute intracranial hemorrhage is seen. There is atrophy. There is periventricular and deep white matter microangiopathic change. There is no midline shift or mass effect. No calvarial fracture is seen. Mucosal thickening is seen within the ethmoid sinuses. Mastoid air cells are clear.      No acute intracranial findings.  Radiation dose reduction techniques were utilized, including automated exposure control and exposure modulation based on body size.   This report was finalized on 8/19/2024 2:37 AM by Dr. Selam Kaur M.D on Workstation: [id number]      XR Knee 1 or 2 View Bilateral    Result Date: 8/19/2024  2 VIEWS BILATERAL KNEES  HISTORY: Fall  COMPARISON: None available.  FINDINGS: Left knee: Patient is status post left knee arthroplasty. Hardware appears to project in expected position. No acute fracture or subluxation of the left knee is seen. There is no suprapatellar effusion. Dense vascular calcifications are noted.  Right knee: The patient is status post right knee arthroplasty. Hardware appears to project in expected position. There is no suprapatellar effusion. Dense vascular calcifications are noted.      No acute fracture  or subluxation identified.  This report was finalized on 8/19/2024 2:35 AM by Dr. Selam Kaur M.D on Workstation: [id number]      XR Shoulder 2+ View Left    Result Date: 8/19/2024  2 VIEWS LEFT SHOULDER  HISTORY: Left shoulder pain  COMPARISON: March 27, 2021  FINDINGS: No acute fracture or subluxation of the left shoulder is seen. There are extensive degenerative changes involving the left glenohumeral and AC joints.      No acute fracture or subluxation identified.  This report was finalized on 8/19/2024 2:33 AM by Dr. Selam Kaur M.D on Workstation: [id number]      XR Hand 2 View Bilateral    Result Date: 8/19/2024  2 VIEWS BILATERAL HANDS  HISTORY: Trauma  COMPARISON: None available.  FINDINGS: Right hand: No acute fracture or subluxation of the right hand is seen. Degenerative changes are noted, most in keeping with osteoarthritis.  Left hand: No acute fracture or subluxation of the left hand is seen. Degenerative changes are noted, most in keeping with osteoarthritis.      No acute fracture or subluxation identified.  This report was finalized on 8/19/2024 2:32 AM by Dr. Selam Kaur M.D on Workstation: [id number]       I ordered the above noted radiological studies. I reviewed the images and results. I agree with the radiologist interpretation.    PROCEDURES  Procedures    MEDICATIONS GIVEN IN ER  Medications - No data to display    PROGRESS, DATA ANALYSIS, CONSULTS, AND MEDICAL DECISION MAKING  A complete history and physical exam have been performed.  All available laboratory and imaging results have been reviewed by myself prior to disposition.    MDM    After the initial H&P, I discussed pertinent information from history and physical exam with patient/family.  Discussed differential diagnosis.  Discussed plan for ED evaluation/workup/treatment.  All questions answered.  Patient/family is agreeable with plan.  ED Course as of 08/19/24 0252   Mon Aug 19, 2024   0135 Fall with closed  head injury, acute neck pain, given patient's age high risk of morbidity and mortality, obtaining CT imaging of head and cervical spine, obtain x-ray imaging of left shoulder as well as bilateral hands and knees. [JG]   0248 I reviewed CT head imaging in PACS, no intracranial hemorrhage per my read. [JG]   0248 I reviewed left shoulder x-ray in PACS, no fracture per my read. [JG]   0249 All imaging negative for acute pathology.  Patient is well-appearing appears appropriate for discharge with outpatient follow-up. [JG]   0249 Patient reassessed, discussed ED workup and results, discussed plan for discharge, need for close follow-up with primary care.  Given extensive discussion return precautions, discharging. [JG]      ED Course User Index  [JG] Haroon Beal MD       AS OF 02:52 EDT VITALS:    BP - 128/81  HR - 71  TEMP - 98.6 °F (37 °C) (Oral)  O2 SATS - 97%    DIAGNOSIS  Final diagnoses:   Fall, initial encounter   Closed head injury, initial encounter   Acute neck pain   Acute pain of left shoulder   Bilateral hand pain   Acute pain of both knees         DISPOSITION  DISCHARGE    Patient discharged in stable condition.    Reviewed implications of results, diagnosis, meds, responsibility to follow up, warning signs and symptoms of possible worsening, potential complications and reasons to return to ER.    Patient/Family voiced understanding of above instructions.    Discussed plan for discharge, as there is no emergent indication for admission. Patient referred to primary care provider for BP management due to today's BP. Pt/family is agreeable and understands need for follow up and repeat testing.  Pt is aware that discharge does not mean that nothing is wrong but it indicates no emergency is present that requires admission and they must continue care with follow-up as given below or physician of their choice.     FOLLOW-UP  Your PCP    Schedule an appointment as soon as possible for a visit in 2  days  even if well    PATIENT CONNECTION - [facility] [id number]  [phone]    if you are unable to follow up with your PCP    your orthopedist    Schedule an appointment as soon as possible for a visit in 2 days           Medication List        New Prescriptions      Diclofenac Sodium 1 % gel gel  Commonly known as: VOLTAREN  Apply 4 g topically to the appropriate area as directed 4 (Four) Times a Day As Needed (pain).  Replaces: diclofenac 1 % gel gel     lidocaine 5 %  Commonly known as: Lidoderm  Place 1 patch on the skin as directed by provider Daily. Remove & Discard patch within 12 hours or as directed by MD            Stop      diclofenac 1 % gel gel  Commonly known as: VOLTAREN  Replaced by: Diclofenac Sodium 1 % gel gel               Where to Get Your Medications        These medications were sent to [facility] - [place], [place] - [address] [facility] - [phone]  - [phone]   [address] [facility] [address], [facility] [address]      Phone: [phone]   Diclofenac Sodium 1 % gel gel  lidocaine 5 %            Haroon Beal MD  08/19/24 0253